# Patient Record
Sex: FEMALE | Race: WHITE | Employment: OTHER | RURAL
[De-identification: names, ages, dates, MRNs, and addresses within clinical notes are randomized per-mention and may not be internally consistent; named-entity substitution may affect disease eponyms.]

---

## 2020-09-14 ENCOUNTER — VIRTUAL VISIT (OUTPATIENT)
Dept: FAMILY MEDICINE CLINIC | Age: 65
End: 2020-09-14
Payer: MEDICARE

## 2020-09-14 DIAGNOSIS — M19.90 ARTHRITIS: ICD-10-CM

## 2020-09-14 DIAGNOSIS — G47.00 INSOMNIA, UNSPECIFIED TYPE: ICD-10-CM

## 2020-09-14 DIAGNOSIS — I10 ESSENTIAL HYPERTENSION: Primary | ICD-10-CM

## 2020-09-14 DIAGNOSIS — Z00.00 ENCOUNTER FOR MEDICAL EXAMINATION TO ESTABLISH CARE: ICD-10-CM

## 2020-09-14 DIAGNOSIS — F33.1 MODERATE EPISODE OF RECURRENT MAJOR DEPRESSIVE DISORDER (HCC): ICD-10-CM

## 2020-09-14 PROCEDURE — G8427 DOCREV CUR MEDS BY ELIG CLIN: HCPCS | Performed by: NURSE PRACTITIONER

## 2020-09-14 PROCEDURE — G8432 DEP SCR NOT DOC, RNG: HCPCS | Performed by: NURSE PRACTITIONER

## 2020-09-14 PROCEDURE — 99202 OFFICE O/P NEW SF 15 MIN: CPT | Performed by: NURSE PRACTITIONER

## 2020-09-14 PROCEDURE — G8400 PT W/DXA NO RESULTS DOC: HCPCS | Performed by: NURSE PRACTITIONER

## 2020-09-14 RX ORDER — CELECOXIB 200 MG/1
200 CAPSULE ORAL
Qty: 90 CAP | Refills: 0 | Status: SHIPPED | OUTPATIENT
Start: 2020-09-14 | End: 2020-12-17

## 2020-09-14 RX ORDER — DULOXETIN HYDROCHLORIDE 30 MG/1
30 CAPSULE, DELAYED RELEASE ORAL DAILY
COMMUNITY
End: 2020-09-14 | Stop reason: SDUPTHER

## 2020-09-14 RX ORDER — DULOXETIN HYDROCHLORIDE 30 MG/1
30 CAPSULE, DELAYED RELEASE ORAL DAILY
Qty: 90 CAP | Refills: 0 | Status: SHIPPED | OUTPATIENT
Start: 2020-09-14 | End: 2020-12-14

## 2020-09-14 RX ORDER — ATENOLOL 50 MG/1
TABLET ORAL DAILY
COMMUNITY
End: 2020-09-14 | Stop reason: SDUPTHER

## 2020-09-14 RX ORDER — ATENOLOL 50 MG/1
50 TABLET ORAL DAILY
Qty: 90 TAB | Refills: 0 | Status: SHIPPED | OUTPATIENT
Start: 2020-09-14 | End: 2020-12-14

## 2020-09-14 RX ORDER — CELECOXIB 200 MG/1
CAPSULE ORAL
COMMUNITY
End: 2020-09-14 | Stop reason: SDUPTHER

## 2020-09-14 RX ORDER — ESZOPICLONE 2 MG/1
TABLET, FILM COATED ORAL
COMMUNITY
End: 2020-09-14 | Stop reason: SDUPTHER

## 2020-09-14 RX ORDER — ESZOPICLONE 2 MG/1
2 TABLET, FILM COATED ORAL
Qty: 90 TAB | Refills: 0 | Status: SHIPPED | OUTPATIENT
Start: 2020-09-14 | End: 2020-12-14

## 2020-09-14 NOTE — PROGRESS NOTES
HISTORY OF PRESENT ILLNESS  Nica Jalloh is a 75 y.o. female. HPI   Pt presents as a new patient to [facility] care  Visit was conducted via [employer] me  Pt was located at home, provider was located at [facility]  Visit lasted 14 minutes  Nica Jalloh, who was evaluated through a synchronous (real-time) audio-video encounter, and/or her healthcare decision maker, is aware that it is a billable service, with coverage as determined by her insurance carrier. She provided verbal consent to proceed: Yes, and patient identification was verified. It was conducted pursuant to the emergency declaration under the [facility], [address] [place] [address] authority and the [employer] and [employer] General Act. A caregiver was present when appropriate. Ability to conduct physical exam was limited. I was in the office. The patient was at home. Pt is a new patient, just moved from [place] about a month ago. Pt was last seen by her PCP in [place] in May, and will get records sent here  Pt has family in the area    Pt was diagnosed with gastroparesis several years ago, and manages this with a modified diet. She has controlled BP on atenolol, and states that she has been on this medication for 20 years. She has chronic depression and arthritis. She does need refills at this time. Review of Systems   Constitutional: Negative for fever. Physical Exam  Constitutional:       Appearance: Normal appearance. Pulmonary:      Effort: Pulmonary effort is normal.   Neurological:      Mental Status: She is alert. Psychiatric:         Mood and Affect: Mood normal.         Behavior: Behavior normal.         ASSESSMENT and PLAN    ICD-10-CM ICD-9-CM    1. Essential hypertension  I10 401.9 atenoloL (TENORMIN) 50 mg tablet   2. Arthritis  M19.90 716.90 DULoxetine (CYMBALTA) 30 mg capsule      celecoxib (CELEBREX) 200 mg capsule   3.  Insomnia, unspecified type  G47.00 780.52 eszopiclone (LUNESTA) 2 mg tablet   4. Moderate episode of recurrent major depressive disorder (HCC)  F33.1 296.32 DULoxetine (CYMBALTA) 30 mg capsule   5. Encounter for medical examination to establish care  Z00.00 V70.9      Educated about returning to office in 3 months, for in person appointment  Will get labs at that time  Educated about getting records faxed here, and will review these when they return    Pt informed to return to office with worsening of symptoms, or PRN with any questions or concerns. Pt verbalizes understanding of plan of care and denies further questions or concerns at this time.

## 2020-11-17 ENCOUNTER — OFFICE VISIT (OUTPATIENT)
Dept: FAMILY MEDICINE CLINIC | Age: 65
End: 2020-11-17
Payer: MEDICARE

## 2020-11-17 VITALS
HEART RATE: 54 BPM | DIASTOLIC BLOOD PRESSURE: 63 MMHG | TEMPERATURE: 97 F | RESPIRATION RATE: 16 BRPM | SYSTOLIC BLOOD PRESSURE: 119 MMHG | WEIGHT: 164 LBS | OXYGEN SATURATION: 98 % | BODY MASS INDEX: 27.32 KG/M2 | HEIGHT: 65 IN

## 2020-11-17 DIAGNOSIS — Z23 ENCOUNTER FOR IMMUNIZATION: ICD-10-CM

## 2020-11-17 DIAGNOSIS — Z00.00 MEDICARE ANNUAL WELLNESS VISIT, SUBSEQUENT: Primary | ICD-10-CM

## 2020-11-17 DIAGNOSIS — Z12.31 ENCOUNTER FOR SCREENING MAMMOGRAM FOR MALIGNANT NEOPLASM OF BREAST: ICD-10-CM

## 2020-11-17 DIAGNOSIS — M25.562 CHRONIC PAIN OF LEFT KNEE: ICD-10-CM

## 2020-11-17 DIAGNOSIS — G89.29 CHRONIC PAIN OF LEFT KNEE: ICD-10-CM

## 2020-11-17 PROCEDURE — 90471 IMMUNIZATION ADMIN: CPT | Performed by: NURSE PRACTITIONER

## 2020-11-17 PROCEDURE — 90715 TDAP VACCINE 7 YRS/> IM: CPT | Performed by: NURSE PRACTITIONER

## 2020-11-17 PROCEDURE — 3017F COLORECTAL CA SCREEN DOC REV: CPT | Performed by: NURSE PRACTITIONER

## 2020-11-17 PROCEDURE — G9899 SCRN MAM PERF RSLTS DOC: HCPCS | Performed by: NURSE PRACTITIONER

## 2020-11-17 PROCEDURE — G8427 DOCREV CUR MEDS BY ELIG CLIN: HCPCS | Performed by: NURSE PRACTITIONER

## 2020-11-17 PROCEDURE — G9717 DOC PT DX DEP/BP F/U NT REQ: HCPCS | Performed by: NURSE PRACTITIONER

## 2020-11-17 PROCEDURE — G0439 PPPS, SUBSEQ VISIT: HCPCS | Performed by: NURSE PRACTITIONER

## 2020-11-17 PROCEDURE — 90715 TDAP VACCINE 7 YRS/> IM: CPT

## 2020-11-17 PROCEDURE — G8419 CALC BMI OUT NRM PARAM NOF/U: HCPCS | Performed by: NURSE PRACTITIONER

## 2020-11-17 PROCEDURE — 1101F PT FALLS ASSESS-DOCD LE1/YR: CPT | Performed by: NURSE PRACTITIONER

## 2020-11-17 PROCEDURE — G8536 NO DOC ELDER MAL SCRN: HCPCS | Performed by: NURSE PRACTITIONER

## 2020-11-17 PROCEDURE — G8400 PT W/DXA NO RESULTS DOC: HCPCS | Performed by: NURSE PRACTITIONER

## 2020-11-17 NOTE — PROGRESS NOTES
Date of visit: 11/17/2020       This is a Subsequent Medicare Annual Wellness Visit (AWV), (Performed more than 12 months after effective date of Medicare Part B enrollment and 12 months after last preventive visit, Once in a lifetime)    I have reviewed the patient's medical history in detail and updated the computerized patient record. Bronson Leiva is a 75 y.o. female   History obtained from: the patient. Concerns today   (Patient understands that medical problems addressed today may incur additional cost as this is a preventive visit)    History     Patient Active Problem List   Diagnosis Code    Gastroparesis K31.84    Arthritis M19.90    Insomnia G47.00    Depression F32.9    HTN (hypertension) I10     Past Medical History:   Diagnosis Date    Arthritis     Depression     Gastroparesis     Hip fracture ([address]     March, right    HTN (hypertension)     Insomnia       No past surgical history on file. Allergies   Allergen Reactions    Corn Starch Rash    Pcn [Penicillins] Rash     Current Outpatient Medications   Medication Sig Dispense Refill    eszopiclone (LUNESTA) 2 mg tablet Take 1 Tab by mouth nightly. Max Daily Amount: 2 mg. 90 Tab 0    DULoxetine (CYMBALTA) 30 mg capsule Take 1 Cap by mouth daily. 90 Cap 0    celecoxib (CELEBREX) 200 mg capsule Take 1 Cap by mouth daily as needed for Pain. 90 Cap 0    atenoloL (TENORMIN) 50 mg tablet Take 1 Tab by mouth daily. 90 Tab 0     No family history on file. Social History     Tobacco Use    Smoking status: Not on file   Substance Use Topics    Alcohol use: Not on file       Specialists/Care Team   Bronson Leiva has established care with the following healthcare providers:  Patient Care Team:  Sushant Salcedo NP as PCP - General (Nurse Practitioner)  Sushant Salcedo NP as PCP - REHABILITATION [facility] Provider      Health Risk Assessment     Demographics   female  75 y.o.     General Health Questions   -During the past 4 weeks:   -how would you rate your health in general? Good   -how often have you been bothered by feeling dizzy when standing up? never   -how much have you been bothered by bodily pain? moderately   -Have you noticed any hearing difficulties? no   -has your physical and emotional health limited your social activities with family or friends? no    Emotional Health Questions   -Do you have a history of depression, anxiety, or emotional problems? yes  -Over the past 2 weeks, have you felt down, depressed or hopeless? no  -Over the past 2 weeks, have you felt little interest or pleasure in doing things? no    Health Habits   Please describe your diet habits: gastroparesis, uses a lot of protein drinks  Do you get 5 servings of fruits or vegetables daily? no  Do you exercise regularly? yes    Activities of Daily Living and Functional Status   -Do you need help with eating, walking, dressing, bathing, toileting, the phone, transportation, shopping, preparing meals, housework, laundry, medications or managing money? no  -In the past four weeks, was someone available to help you if you needed and wanted help with anything? yes  -Are you confident are you that you can control and manage most of your health problems? yes  -Have you been given information to help you keep track of your medications? yes  -How often do you have trouble taking your medications as prescribed? never    Fall Risk and Home Safety   Have you fallen 2 or more times in the past year? Yes, Poornima Greenberg in march in [place] in the yard. Then was going down the steps, and got caught on the tread. Just some bruising noted. Does your home have rugs in the hallway, lack grab bars in the bathroom, lack handrails on the stairs or have poor lighting? no  Do you have smoke detectors and check them regularly?  yes  Do you have difficulties driving a car? no  Do you always fasten your seat belt when you are in a car? yes    Review of Systems (if indicated for problems addressed today) Physical Examination   There were no vitals filed for this visit. There is no height or weight on file to calculate BMI. No exam data present  Was the patient's timed Up & Go test unsteady or longer than 30 seconds? no    Evaluation of Cognitive Function   Mood/affect:  happy  Orientation: Person, Place, Time and Situation  Appearance: age appropriate  Family member/caregiver input: none    Additional exam if indicated for problems addressed today:  Pt has been dealing with medial left knee pain for years. She was seen by ortho in [place] for this, and was supposed to get an MRI of the knee, but moved before she was able to get this done. Pain comes and goes  Swelling is noted at times  She states that they did x-ray in [place] and it showed nothing, hence why they were planning on MRI    Advice/Referrals/Counseling (as indicated)   Education and counseling provided for any problems identified above: none    Preventive Services     (Preventive care checklist to be included in patient instructions)  Discussed today Done Previously Not Needed    x   Pneumococcal vaccines   x   Flu vaccine     x Hepatitis B vaccine (if at risk)   x   Shingles vaccine   x   TDAP vaccine   x   Mammogram   x   Pap smear   x   Colorectal cancer screening     x Low-dose CT for lung cancer screening   x   Bone density test    x  Glaucoma screening    x  Cholesterol test     x Diabetes screening test      x Diabetes self-management class     x Nutritionist referral for diabetes or renal disease     Discussion of Advance Directive   Discussed with Arabella Fuentes her ability to prepare and advance directive in the case that an injury or illness causes her to be unable to make health care decisions. Pt was given copy of Advance Directive, and will complete at home.     Assessment/Plan   Z00.00    Vaccine and VIS given    Educated about getting colonoscopy records sent here  Should get mammo ASAP  Pt declined all other  topics at this time    Should call ortho for an appointment today    Pt informed to return to office with worsening of symptoms, or PRN with any questions or concerns. Pt verbalizes understanding of plan of care and denies further questions or concerns at this time. No orders of the defined types were placed in this encounter.           Nieves De Los Santos NP

## 2020-11-17 NOTE — PATIENT INSTRUCTIONS
Vaccine Information Statement    Tdap (Tetanus, Diphtheria, Pertussis) Vaccine: What you need to know     Many Vaccine Information Statements are available in Georgian and other languages. See www.immunize.org/vis  Hojas de información sobre vacunas están disponibles en español y en muchos otros idiomas. Visite www.immunize.org/vis    1. Why get vaccinated? Tdap vaccine can prevent tetanus, diphtheria, and pertussis. Diphtheria and pertussis spread from person to person. Tetanus enters the body through cuts or wounds.  TETANUS (T) causes painful stiffening of the muscles. Tetanus can lead to serious health problems, including being unable to open the mouth, having trouble swallowing and breathing, or death.  DIPHTHERIA (D) can lead to difficulty breathing, heart failure, paralysis, or death.  PERTUSSIS (aP), also known as whooping cough, can cause uncontrollable, violent coughing which makes it hard to breathe, eat, or drink. Pertussis can be extremely serious in babies and young children, causing pneumonia, convulsions, brain damage, or death. In teens and adults, it can cause weight loss, loss of bladder control, passing out, and rib fractures from severe coughing. 2. Tdap vaccine     Tdap is only for children 7 years and older, adolescents, and adults. Adolescents should receive a single dose of Tdap, preferably at age 6 or 15 years. Pregnant women should get a dose of Tdap during every pregnancy, to protect the  from pertussis. Infants are most at risk for severe, life-threatening complications from pertussis. Adults who have never received Tdap should get a dose of Tdap. Also, adults should receive a booster dose every 10 years, or earlier in the case of a severe and dirty wound or burn. Booster doses can be either Tdap or Td (a different vaccine that protects against tetanus and diphtheria but not pertussis).      Tdap may be given at the same time as other vaccines. 3. Talk with your health care provider    Tell your vaccine provider if the person getting the vaccine:   Has had an allergic reaction after a previous dose of any vaccine that protects against tetanus, diphtheria, or pertussis, or has any severe, life-threatening allergies.  Has had a coma, decreased level of consciousness, or prolonged seizures within 7 days after a previous dose of any pertussis vaccine (DTP, DTaP, or Tdap).  Has seizures or another nervous system problem.  Has ever had Guillain-Barré Syndrome (also called GBS).  Has had severe pain or swelling after a previous dose of any vaccine that protects against tetanus or diphtheria. In some cases, your health care provider may decide to postpone Tdap vaccination to a future visit. People with minor illnesses, such as a cold, may be vaccinated. People who are moderately or severely ill should usually wait until they recover before getting Tdap vaccine. Your health care provider can give you more information. 4. Risks of a vaccine reaction     Pain, redness, or swelling where the shot was given, mild fever, headache, feeling tired, and nausea, vomiting, diarrhea, or stomachache sometimes happen after Tdap vaccine. People sometimes faint after medical procedures, including vaccination. Tell your provider if you feel dizzy or have vision changes or ringing in the ears. As with any medicine, there is a very remote chance of a vaccine causing a severe allergic reaction, other serious injury, or death. 5. What if there is a serious problem? An allergic reaction could occur after the vaccinated person leaves the clinic. If you see signs of a severe allergic reaction (hives, swelling of the face and throat, difficulty breathing, a fast heartbeat, dizziness, or weakness), call 9-1-1 and get the person to the nearest hospital.    For other signs that concern you, call your health care provider.     Adverse reactions should be reported to the Vaccine Adverse Event Reporting System (VAERS). Your health care provider will usually file this report, or you can do it yourself. Visit the VAERS website at www.vaers. hhs.gov or call 4-746.635.3550. VAERS is only for reporting reactions, and VAERS staff do not give medical advice. 6. The National Vaccine Injury Compensation Program    The [facility] Vaccine Injury Compensation Program (VICP) is a federal program that was created to compensate people who may have been injured by certain vaccines. Visit the VICP website at www.[facility].gov/vaccinecompensation or call 4-511.286.7673 to learn about the program and about filing a claim. There is a time limit to file a claim for compensation. 7. How can I learn more?  Ask your health care provider.  Call your local or state health department.  Contact the Centers for Disease Control and Prevention (CDC):  - Call 7-710.856.8085 (1-800-CDC-INFO) or  - Visit CDCs website at www.cdc.gov/vaccines    Vaccine Information Statement (Interim)  Tdap (Tetanus, Diphtheria, Pertussis) Vaccine  04/01/2020  [address] NIGEL Lechuga [id number]   Department of Health and Human Services  Centers for Disease Control and Prevention    Office Use Only

## 2020-11-17 NOTE — PROGRESS NOTES
Identified pt with two pt identifiers(name and ). Chief Complaint   Patient presents with    Knee Pain     left knee pain - has had this assessed in the past in [place] and was just approved for MRI to assess it prior to moving to [place] so it was never done - has had ortho physician in [place] fax information to our office for review.  Annual Wellness Visit        Health Maintenance Due   Topic    Hepatitis C Screening     DTaP/Tdap/Td series (1 - Tdap)    Lipid Screen     Shingrix Vaccine Age 49> (1 of 2)    Colorectal Cancer Screening Combo     Breast Cancer Screen Mammogram     GLAUCOMA SCREENING Q2Y     Bone Densitometry (Dexa) Screening     Pneumococcal 65+ years (1 of 1 - PPSV23)    Flu Vaccine (1)    Medicare Yearly Exam        Wt Readings from Last 3 Encounters:   20 164 lb (74.4 kg)     Temp Readings from Last 3 Encounters:   20 97 °F (36.1 °C) (Oral)     BP Readings from Last 3 Encounters:   20 119/63     Pulse Readings from Last 3 Encounters:   20 (!) 54         Learning Assessment:  :     Learning Assessment 2020   PRIMARY LEARNER Patient   HIGHEST LEVEL OF EDUCATION - PRIMARY LEARNER  SOME COLLEGE   BARRIERS PRIMARY LEARNER NONE   CO-LEARNER CAREGIVER No   PRIMARY LANGUAGE ENGLISH   LEARNER PREFERENCE PRIMARY LISTENING     DEMONSTRATION   ANSWERED BY patient   RELATIONSHIP SELF       Depression Screening:  :     3 most recent PHQ Screens 2020   Little interest or pleasure in doing things Not at all   Feeling down, depressed, irritable, or hopeless Not at all   Total Score PHQ 2 0       Fall Risk Assessment:  :     No flowsheet data found. Abuse Screening:  :     Abuse Screening Questionnaire 2020   Do you ever feel afraid of your partner? N   Are you in a relationship with someone who physically or mentally threatens you? N   Is it safe for you to go home?  Y         Coordination of Care Questionnaire:  :     1) Have you been to an emergency room, urgent care clinic since your last visit? no   Hospitalized since your last visit? no             2) Have you seen or consulted any other health care providers outside of [address] [place] [address] since your last visit? no  (Include any pap smears or colon screenings in this section.)    3) Do you have an Advance Directive on file? no  Are you interested in receiving information about Advance Directives? no    Patient is accompanied by self. Reviewed record in preparation for visit and have obtained necessary documentation. Medication reconciliation up to date and corrected with patient at this time.

## 2020-12-12 DIAGNOSIS — I10 ESSENTIAL HYPERTENSION: ICD-10-CM

## 2020-12-12 DIAGNOSIS — F33.1 MODERATE EPISODE OF RECURRENT MAJOR DEPRESSIVE DISORDER (HCC): ICD-10-CM

## 2020-12-12 DIAGNOSIS — G47.00 INSOMNIA, UNSPECIFIED TYPE: ICD-10-CM

## 2020-12-12 DIAGNOSIS — M19.90 ARTHRITIS: ICD-10-CM

## 2020-12-14 RX ORDER — DULOXETIN HYDROCHLORIDE 30 MG/1
CAPSULE, DELAYED RELEASE ORAL
Qty: 90 CAP | Refills: 0 | Status: SHIPPED | OUTPATIENT
Start: 2020-12-14 | End: 2021-03-23

## 2020-12-14 RX ORDER — ESZOPICLONE 2 MG/1
2 TABLET, FILM COATED ORAL
Qty: 90 TAB | Refills: 0 | Status: SHIPPED | OUTPATIENT
Start: 2020-12-14 | End: 2021-03-23

## 2020-12-14 RX ORDER — ATENOLOL 50 MG/1
TABLET ORAL
Qty: 90 TAB | Refills: 0 | Status: SHIPPED | OUTPATIENT
Start: 2020-12-14 | End: 2021-06-17

## 2020-12-17 DIAGNOSIS — M19.90 ARTHRITIS: ICD-10-CM

## 2020-12-17 RX ORDER — CELECOXIB 200 MG/1
200 CAPSULE ORAL
Qty: 90 CAP | Refills: 0 | Status: SHIPPED | OUTPATIENT
Start: 2020-12-17 | End: 2021-09-22 | Stop reason: SDUPTHER

## 2020-12-22 ENCOUNTER — TELEPHONE (OUTPATIENT)
Dept: FAMILY MEDICINE CLINIC | Age: 65
End: 2020-12-22

## 2020-12-22 DIAGNOSIS — G89.29 CHRONIC PAIN OF LEFT KNEE: Primary | ICD-10-CM

## 2020-12-22 DIAGNOSIS — M25.562 CHRONIC PAIN OF LEFT KNEE: Primary | ICD-10-CM

## 2020-12-22 NOTE — TELEPHONE ENCOUNTER
Please see ortho order in the system done on 11/17/20. Americo Garrett also spoke with the pt on 11/17/20. Pt was supposed to callback with date of appt so referral could be done.

## 2020-12-22 NOTE — TELEPHONE ENCOUNTER
Per further review of the chart the last order for ortho was \"closed\" by Elli Irizarry on 12/17/20. Please place new order in system for pt and send to Sabine Davis at  to process please. Pt indicated that her upcoming appt is on 01/20/21. Thanks.

## 2020-12-22 NOTE — TELEPHONE ENCOUNTER
Doctor referral needs to be put in system for physical therapy for  to get the [place] insurance referral done - see pt's not below? ?    ----- Message from Reza Rico sent at 12/22/2020  8:47 AM EST -----  Regarding: MAIK Lopez/Telephone  Caller's first and last name: N/A  Reason for call: Pt is trying to contact office  Callback required yes/no and why: yes  Best contact number(s): [phone]  Details to clarify the request: Pt has been trying to reach the office for 2 weeks and has left several messages and no one has gotten back to her. Pt needs her prescriptions filled at a different pharmacy. She needs them filled at [facility], and she is running out of medication. PT had ortho apt 2 weeks ago and [facility] never sent the referral paperwork. Pt had to cancel and reschedule. They have still not received the paperwork. Pt needs the paperwork before January mid-month since she had to reschedule her apt. Pt is upset because she never hears back from the office.

## 2021-02-25 ENCOUNTER — TELEPHONE (OUTPATIENT)
Dept: FAMILY MEDICINE CLINIC | Age: 66
End: 2021-02-25

## 2021-02-25 NOTE — TELEPHONE ENCOUNTER
I am happy to review the form and see if I can fill out without visit, but it is not in my box?   Thanks

## 2021-02-25 NOTE — TELEPHONE ENCOUNTER
Patient needs to have the faxed document filled out by [facility] so that she is able to donate plasma, they faxed the paper this morning to our office. Because of her age they what approval for her to be able to donate from her pcp. Please do asap. She would like to be able to donate tomorrow. You can call her at [phone]. She tried to donate today but was turned away.

## 2021-02-26 ENCOUNTER — VIRTUAL VISIT (OUTPATIENT)
Dept: FAMILY MEDICINE CLINIC | Age: 66
End: 2021-02-26

## 2021-02-26 DIAGNOSIS — L50.9 HIVES: Primary | ICD-10-CM

## 2021-02-26 PROCEDURE — 99441 PR PHYS/QHP TELEPHONE EVALUATION 5-10 MIN: CPT | Performed by: NURSE PRACTITIONER

## 2021-02-26 RX ORDER — TRIAMCINOLONE ACETONIDE 1 MG/G
CREAM TOPICAL 2 TIMES DAILY
Qty: 45 G | Refills: 0 | Status: SHIPPED | OUTPATIENT
Start: 2021-02-26

## 2021-02-26 NOTE — TELEPHONE ENCOUNTER
Pt called back to check on form and was informed that not received and will call company to get it re faxed over today

## 2021-02-26 NOTE — PROGRESS NOTES
HISTORY OF PRESENT ILLNESS  Aline Santos is a 77 y.o. female. HPI  Pt presents with \"hives\"  Visit was conducted via telephone  Pt was located at home, provider was located at [facility]  Visit lasted 5 minutes  Aline Santos, who was evaluated through a synchronous (real-time) audio only encounter, and/or her healthcare decision maker, is aware that it is a billable service, with coverage as determined by her insurance carrier. She provided verbal consent to proceed: Yes, and patient identification was verified. It was conducted pursuant to the emergency declaration under the [facility], [address] [place] [address] authority and the [employer] and [employer] General Act. A caregiver was present when appropriate. Ability to conduct physical exam was limited. I was in the office. The patient was at home. Pt states that about 4 years ago, she had a stressful event that caused her to break out in hives  She states that since then, when she gets stressed out, she breaks out in hives  She was given a cream, kenalog, and this has worked very well  She is about to be out of the bottle, and was hoping for a refill  Review of Systems   Constitutional: Negative for fever. Skin: Positive for itching and rash. Physical Exam  Neurological:      Mental Status: She is alert. Psychiatric:         Behavior: Behavior normal.         ASSESSMENT and PLAN    ICD-10-CM ICD-9-CM    1. Hives  L50.9 708.9 triamcinolone acetonide (KENALOG) 0.1 % topical cream     Cream refilled  Educated about using as prescribed    Pt informed to return to office with worsening of symptoms, or PRN with any questions or concerns. Pt verbalizes understanding of plan of care and denies further questions or concerns at this time.

## 2021-03-01 ENCOUNTER — PATIENT MESSAGE (OUTPATIENT)
Dept: FAMILY MEDICINE CLINIC | Age: 66
End: 2021-03-01

## 2021-03-01 ENCOUNTER — TELEPHONE (OUTPATIENT)
Dept: FAMILY MEDICINE CLINIC | Age: 66
End: 2021-03-01

## 2021-03-01 NOTE — TELEPHONE ENCOUNTER
Please call patient and let her know that we received the paperwork in regards to her wanting to donate plasma. She has to have kidney function labs before this can be completed, so once she comes in for physical on Friday and gets her labs performed, I will be able to complete the form  Thanks!

## 2021-03-01 NOTE — TELEPHONE ENCOUNTER
----- Message from Maria Del Carmen Rand sent at 3/1/2021  7:15 AM EST -----  Regarding: /Telephone  General Message/Vendor Calls    Caller's first and last name:Laly Ortega      Reason for call:reschedule labs early in the morning for rescheduled appt       Callback required yes/no and why:yes      Best contact number(s):[phone]      Details to clarify the request:Pt requesting to schedule early morning labs and is following up on if paperwork were fax to [employer] .      Maria Del Carmen Rand

## 2021-03-05 ENCOUNTER — LAB ONLY (OUTPATIENT)
Dept: FAMILY MEDICINE CLINIC | Age: 66
End: 2021-03-05

## 2021-03-05 ENCOUNTER — OFFICE VISIT (OUTPATIENT)
Dept: FAMILY MEDICINE CLINIC | Age: 66
End: 2021-03-05
Payer: MEDICARE

## 2021-03-05 VITALS
TEMPERATURE: 98 F | DIASTOLIC BLOOD PRESSURE: 62 MMHG | BODY MASS INDEX: 27.49 KG/M2 | HEIGHT: 65 IN | WEIGHT: 165 LBS | SYSTOLIC BLOOD PRESSURE: 118 MMHG | HEART RATE: 53 BPM

## 2021-03-05 DIAGNOSIS — Z13.220 SCREENING FOR CHOLESTEROL LEVEL: ICD-10-CM

## 2021-03-05 DIAGNOSIS — Z13.29 SCREENING FOR THYROID DISORDER: ICD-10-CM

## 2021-03-05 DIAGNOSIS — Z13.820 SCREENING FOR OSTEOPOROSIS: ICD-10-CM

## 2021-03-05 DIAGNOSIS — Z12.31 ENCOUNTER FOR SCREENING MAMMOGRAM FOR MALIGNANT NEOPLASM OF BREAST: ICD-10-CM

## 2021-03-05 DIAGNOSIS — Z11.59 ENCOUNTER FOR HEPATITIS C SCREENING TEST FOR LOW RISK PATIENT: ICD-10-CM

## 2021-03-05 DIAGNOSIS — I10 ESSENTIAL HYPERTENSION: Primary | ICD-10-CM

## 2021-03-05 DIAGNOSIS — I10 ESSENTIAL HYPERTENSION: ICD-10-CM

## 2021-03-05 PROCEDURE — 1101F PT FALLS ASSESS-DOCD LE1/YR: CPT | Performed by: NURSE PRACTITIONER

## 2021-03-05 PROCEDURE — 1090F PRES/ABSN URINE INCON ASSESS: CPT | Performed by: NURSE PRACTITIONER

## 2021-03-05 PROCEDURE — 99397 PER PM REEVAL EST PAT 65+ YR: CPT | Performed by: NURSE PRACTITIONER

## 2021-03-05 PROCEDURE — 3017F COLORECTAL CA SCREEN DOC REV: CPT | Performed by: NURSE PRACTITIONER

## 2021-03-05 PROCEDURE — G9717 DOC PT DX DEP/BP F/U NT REQ: HCPCS | Performed by: NURSE PRACTITIONER

## 2021-03-05 PROCEDURE — G8752 SYS BP LESS 140: HCPCS | Performed by: NURSE PRACTITIONER

## 2021-03-05 PROCEDURE — G8754 DIAS BP LESS 90: HCPCS | Performed by: NURSE PRACTITIONER

## 2021-03-05 PROCEDURE — G9899 SCRN MAM PERF RSLTS DOC: HCPCS | Performed by: NURSE PRACTITIONER

## 2021-03-05 PROCEDURE — G8419 CALC BMI OUT NRM PARAM NOF/U: HCPCS | Performed by: NURSE PRACTITIONER

## 2021-03-05 NOTE — PATIENT INSTRUCTIONS
Cholesterol and Triglycerides Tests: About These Tests  What are they? Cholesterol and triglycerides tests measure the amount of fats in your blood. These fats have both \"good\" (HDL) and \"bad\" (LDL) cholesterol. Why are these tests done? These tests are done to help find out your risk of a heart attack and stroke. Your doctor uses your cholesterol levels plus other things such as blood pressure, age, and sex to calculate your risk. These tests also help your doctor find out how well medicine is working for some health problems. How do you prepare for these tests? · Your doctor may tell you to fast before your tests. This means that you do not eat or drink anything except water for 9 to 12 hours before the tests. In most cases, you can take your medicines with water the morning of the test.  · Do not eat high-fat foods the night before the tests. · Do not drink alcohol or do intense exercise the night before the tests. · Tell your doctor ALL the medicines, vitamins, supplements, and herbal remedies you take. Some may increase the risk of problems during your test. Your doctor will tell you if you should stop taking any of them before the test and how soon to do it. How are these tests done? A health professional uses a needle to take a blood sample, usually from the arm. Where can you learn more? Go to http://www.gray.com/  Enter V788 in the search box to learn more about \"Cholesterol and Triglycerides Tests: About These Tests. \"  Current as of: December 16, 2019               Content Version: 12.6  © 0691-8173 [employer]. Care instructions adapted under license by [employer] (which disclaims liability or warranty for this information). If you have questions about a medical condition or this instruction, always ask your healthcare professional. [address] any warranty or liability for your use of this information.

## 2021-03-05 NOTE — PROGRESS NOTES
Subjective:   77 y.o. female for Well Woman Check. Her gyne and breast care is done elsewhere by her Ob-Gyne physician. Patient Active Problem List    Diagnosis Date Noted    Gastroparesis     Arthritis     Insomnia     Depression     HTN (hypertension)      Current Outpatient Medications   Medication Sig Dispense Refill    triamcinolone acetonide (KENALOG) 0.1 % topical cream Apply  to affected area two (2) times a day. use thin layer 45 g 0    celecoxib (CELEBREX) 200 mg capsule TAKE 1 CAP BY MOUTH DAILY AS NEEDED FOR PAIN. 90 Cap 0    eszopiclone (LUNESTA) 2 mg tablet TAKE 1 TAB BY MOUTH NIGHTLY. MAX DAILY AMOUNT: 2 MG. 90 Tab 0    atenoloL (TENORMIN) 50 mg tablet TAKE 1 TABLET BY MOUTH EVERY DAY 90 Tab 0    DULoxetine (CYMBALTA) 30 mg capsule TAKE 1 CAPSULE BY MOUTH EVERY DAY 90 Cap 0     Allergies   Allergen Reactions    Corn Starch Rash    Pcn [Penicillins] Rash     Past Medical History:   Diagnosis Date    Arthritis     Depression     Gastroparesis     Hip fracture ([address]     March, right    HTN (hypertension)     Insomnia      Past Surgical History:   Procedure Laterality Date    HX APPENDECTOMY      HX BILATERAL SALPINGO-OOPHORECTOMY      HX CARPAL TUNNEL RELEASE Right     HX DILATION AND CURETTAGE      HX OTHER SURGICAL      Surgery repairs on both knees/Lateral Release    HX SHOULDER ARTHROSCOPY       Family History   Problem Relation Age of Onset    Diabetes Mother     Heart Disease Mother     COPD Mother     Hypertension Father     Kidney Disease Father      Social History     Tobacco Use    Smoking status: Former Smoker     Packs/day: 0.50     Quit date:      Years since quittin.1    Smokeless tobacco: Never Used   Substance Use Topics    Alcohol use: Not Currently        no labs done prior     Specific concerns today: Pt is here for physical and fasting labs. She is going to start donating plasma, and needs form completed for this.     She will get records sent here for her colonoscopy, as she believes that she is up to date. Review of Systems  A comprehensive review of systems was negative. Objective:   Blood pressure 118/62, pulse (!) 53, temperature 98 °F (36.7 °C), temperature source Oral, height 5' 5\" (1.651 m), weight 165 lb (74.8 kg). Physical Examination:   General appearance - alert, well appearing, and in no distress  Mental status - alert, oriented to person, place, and time  Eyes - pupils equal and reactive, extraocular eye movements intact  Ears - bilateral TM's and external ear canals normal  Neck - supple, no significant adenopathy  Lymphatics - no palpable lymphadenopathy, no hepatosplenomegaly  Chest - clear to auscultation, no wheezes, rales or rhonchi, symmetric air entry  Heart - normal rate, regular rhythm, normal S1, S2, no murmurs, rubs, clicks or gallops  Abdomen - soft, nontender, nondistended, no masses or organomegaly  Neurological - alert, oriented, normal speech, no focal findings or movement disorder noted  Extremities - peripheral pulses normal, no pedal edema, no clubbing or cyanosis  Skin - normal coloration and turgor, no rashes, no suspicious skin lesions noted     Assessment/Plan:     continue present plan, routine labs ordered    ICD-10-CM ICD-9-CM    1. Essential hypertension  I10 401.9 CBC W/O DIFF      METABOLIC PANEL, COMPREHENSIVE   2. Screening for cholesterol level  Z13.220 V77.91 LIPID PANEL   3. Screening for thyroid disorder  Z13.29 V77.0 THYROID CASCADE PROFILE   4. Encounter for hepatitis C screening test for low risk patient  Z11.59 V73.89 HEPATITIS C AB   5. Encounter for screening mammogram for malignant neoplasm of breast  Z12.31 V76.12 [facility] MAMMO BI SCREENING INCL CAD   6.  Screening for osteoporosis  Z13.820 V82.81 DEXA BONE DENSITY STUDY AXIAL      [facility] MAMMO BI SCREENING INCL CAD     Will notify when labs return, and inform her of any change in plan of care at that time  Educated about calling for DEXA and mammogram appointments    Pt informed to return to office with worsening of symptoms, or PRN with any questions or concerns. Pt verbalizes understanding of plan of care and denies further questions or concerns at this time.

## 2021-03-05 NOTE — PROGRESS NOTES
Identified pt with two pt identifiers(name and ).    Chief Complaint   Patient presents with   • Hypertension   • Depression   • Sleep Problem   • Medication Refill   • Labs     NPO since midnight        Health Maintenance Due   Topic   • Hepatitis C Screening    • COVID-19 Vaccine (1 of 2)   • Lipid Screen    • Colorectal Cancer Screening Combo    • Breast Cancer Screen Mammogram    • GLAUCOMA SCREENING Q2Y    • Bone Densitometry (Dexa) Screening        Wt Readings from Last 3 Encounters:   21 165 lb (74.8 kg)   20 164 lb (74.4 kg)     Temp Readings from Last 3 Encounters:   21 98 °F (36.7 °C) (Oral)   20 97 °F (36.1 °C) (Oral)     BP Readings from Last 3 Encounters:   21 118/62   20 119/63     Pulse Readings from Last 3 Encounters:   21 (!) 53   20 (!) 54         Learning Assessment:  :     Learning Assessment 2020   PRIMARY LEARNER Patient   HIGHEST LEVEL OF EDUCATION - PRIMARY LEARNER  SOME COLLEGE   BARRIERS PRIMARY LEARNER NONE   CO-LEARNER CAREGIVER No   PRIMARY LANGUAGE ENGLISH   LEARNER PREFERENCE PRIMARY LISTENING     DEMONSTRATION   ANSWERED BY patient   RELATIONSHIP SELF       Depression Screening:  :     3 most recent PHQ Screens 2020   Little interest or pleasure in doing things Not at all   Feeling down, depressed, irritable, or hopeless Not at all   Total Score PHQ 2 0       Fall Risk Assessment:  :     No flowsheet data found.    Abuse Screening:  :     Abuse Screening Questionnaire 2020   Do you ever feel afraid of your partner? N   Are you in a relationship with someone who physically or mentally threatens you? N   Is it safe for you to go home? Y                 Coordination of Care Questionnaire:  :     1) Have you been to an emergency room, urgent care clinic since your last visit? no   Hospitalized since your last visit? no             2) Have you seen or consulted any other health care providers outside of [facility] since your last visit? no  (Include any pap smears or colon screenings in this section.)    3) Do you have an Advance Directive on file? no  Are you interested in receiving information about Advance Directives? no    Patient is accompanied by self. Reviewed record in preparation for visit and have obtained necessary documentation. Medication reconciliation up to date and corrected with patient at this time.

## 2021-03-06 LAB
ALBUMIN SERPL-MCNC: 4.1 G/DL (ref 3.5–5)
ALBUMIN/GLOB SERPL: 1.5 {RATIO} (ref 1.1–2.2)
ALP SERPL-CCNC: 78 U/L (ref 45–117)
ALT SERPL-CCNC: 27 U/L (ref 12–78)
ANION GAP SERPL CALC-SCNC: 6 MMOL/L (ref 5–15)
AST SERPL-CCNC: 18 U/L (ref 15–37)
BILIRUB SERPL-MCNC: 0.4 MG/DL (ref 0.2–1)
BUN SERPL-MCNC: 21 MG/DL (ref 6–20)
BUN/CREAT SERPL: 27 (ref 12–20)
CALCIUM SERPL-MCNC: 9 MG/DL (ref 8.5–10.1)
CHLORIDE SERPL-SCNC: 109 MMOL/L (ref 97–108)
CHOLEST SERPL-MCNC: 224 MG/DL
CO2 SERPL-SCNC: 26 MMOL/L (ref 21–32)
CREAT SERPL-MCNC: 0.77 MG/DL (ref 0.55–1.02)
ERYTHROCYTE [DISTWIDTH] IN BLOOD BY AUTOMATED COUNT: 12 % (ref 11.5–14.5)
GLOBULIN SER CALC-MCNC: 2.8 G/DL (ref 2–4)
GLUCOSE SERPL-MCNC: 115 MG/DL (ref 65–100)
HCT VFR BLD AUTO: 43.7 % (ref 35–47)
HCV AB SERPL QL IA: NONREACTIVE
HCV COMMENT,HCGAC: NORMAL
HDLC SERPL-MCNC: 75 MG/DL
HDLC SERPL: 3 {RATIO} (ref 0–5)
HGB BLD-MCNC: 14.4 G/DL (ref 11.5–16)
LDLC SERPL CALC-MCNC: 129.4 MG/DL (ref 0–100)
LIPID PROFILE,FLP: ABNORMAL
MCH RBC QN AUTO: 30.6 PG (ref 26–34)
MCHC RBC AUTO-ENTMCNC: 33 G/DL (ref 30–36.5)
MCV RBC AUTO: 93 FL (ref 80–99)
NRBC # BLD: 0 K/UL (ref 0–0.01)
NRBC BLD-RTO: 0 PER 100 WBC
PLATELET # BLD AUTO: 183 K/UL (ref 150–400)
PMV BLD AUTO: 11.3 FL (ref 8.9–12.9)
POTASSIUM SERPL-SCNC: 4.5 MMOL/L (ref 3.5–5.1)
PROT SERPL-MCNC: 6.9 G/DL (ref 6.4–8.2)
RBC # BLD AUTO: 4.7 M/UL (ref 3.8–5.2)
SODIUM SERPL-SCNC: 141 MMOL/L (ref 136–145)
TRIGL SERPL-MCNC: 98 MG/DL (ref ?–150)
VLDLC SERPL CALC-MCNC: 19.6 MG/DL
WBC # BLD AUTO: 5.3 K/UL (ref 3.6–11)

## 2021-03-07 LAB — TSH SERPL-ACNC: 1.69 UIU/ML (ref 0.45–4.5)

## 2021-03-08 DIAGNOSIS — R73.01 ELEVATED FASTING GLUCOSE: Primary | ICD-10-CM

## 2021-03-08 NOTE — PROGRESS NOTES
Please call patient and let her know that her labs returned:  1. Cholesterol is elevated. Would advise low dose statin, to prevent heart attack and stroke. Is she willing to start? 2.  Glucose is mildly elevated, should return for lab only check of HgbA1C. I have completed forms for plasma center and faxed them in.   Thanks

## 2021-03-08 NOTE — PROGRESS NOTES
Patient was advised and verbalized understanding. She will come in tomorrow, 03/09/21 to check HgbA1c and would like to try diet control for the next 6 months noting if it is not effective she will start statin medication.

## 2021-03-08 NOTE — TELEPHONE ENCOUNTER
Forms have been completed and faxed back to [facility] at [phone]. Fax confirmation page was received showing the fax was sent successfully. Pt was advised via [employer] message.

## 2021-03-09 ENCOUNTER — TELEPHONE (OUTPATIENT)
Dept: FAMILY MEDICINE CLINIC | Age: 66
End: 2021-03-09

## 2021-03-09 ENCOUNTER — LAB ONLY (OUTPATIENT)
Dept: FAMILY MEDICINE CLINIC | Age: 66
End: 2021-03-09

## 2021-03-09 DIAGNOSIS — R73.01 ELEVATED FASTING GLUCOSE: ICD-10-CM

## 2021-03-09 NOTE — TELEPHONE ENCOUNTER
Scanning was picked up from our nurse station this morning to be sent to be scanned. I have asked Justin Seth that takes care of sending documents needing to be scanned out, to please make copy of her paperwork before it leaves the office for the pt. She advised me she would do this when she has a minute.

## 2021-03-09 NOTE — TELEPHONE ENCOUNTER
Patient wants a hard copy of the paper that were sent to [employer] so that she could donate plasma

## 2021-03-10 LAB
EST. AVERAGE GLUCOSE BLD GHB EST-MCNC: 120 MG/DL
HBA1C MFR BLD: 5.8 % (ref 4–5.6)

## 2021-03-10 NOTE — PROGRESS NOTES
Please call patient and let her know that her HgbA1C returned and is in prediabetic range. Needs to decrease carbs and sugar. Will continue to monitor, to ensure that this does not increase.   Thanks

## 2021-03-12 NOTE — TELEPHONE ENCOUNTER
----- Message from Karena Espinoza sent at 3/12/2021  8:38 AM EST -----  Regarding: MAIK Lopez/Telephone  Patient return call    Caller's first and last name and relationship (if not the patient):      Best contact number(s): [phone]      Whose call is being returned: Johnny Jacobson      Details to clarify the request: In regards to lab results       Karena Espinoza

## 2021-03-12 NOTE — TELEPHONE ENCOUNTER
Patient was advised of her recent lab results and verbalized understanding and has no further questions at this time.

## 2021-03-17 ENCOUNTER — TELEPHONE (OUTPATIENT)
Dept: FAMILY MEDICINE CLINIC | Age: 66
End: 2021-03-17

## 2021-03-17 NOTE — TELEPHONE ENCOUNTER
----- Message from Danay Bejarano sent at 3/17/2021  8:34 AM EDT -----  Regarding: MAIK Lopez/Telephone  General Message/Vendor Calls    Caller's first and last name: Daquan Vo      Reason for call: Resend Form to [facility] required yes/no and why: Yes      Best contact number(s): [phone]      Details to clarify the request: Pt stated that Gustavo Calle still has not received the forms that the pt requested PMA to send. PMA stated the forms were sent Monday, 3/8/21 but [facility] still has not received them.       Danay Bejarano

## 2021-03-17 NOTE — TELEPHONE ENCOUNTER
Beto Mcleod  3/9/21 3:01 PM  Note  Copy was made and sitting up front for pt to pickup 3/9/21 3:00 pm\"

## 2021-04-05 DIAGNOSIS — Z12.2 ENCOUNTER FOR SCREENING FOR LUNG CANCER: Primary | ICD-10-CM

## 2021-04-21 ENCOUNTER — HOSPITAL ENCOUNTER (OUTPATIENT)
Dept: CT IMAGING | Age: 66
Discharge: HOME | End: 2021-04-21
Attending: NURSE PRACTITIONER
Payer: MEDICARE

## 2021-04-21 DIAGNOSIS — Z12.2 ENCOUNTER FOR SCREENING FOR LUNG CANCER: ICD-10-CM

## 2021-04-21 PROCEDURE — 71271 CT THORAX LUNG CANCER SCR C-: CPT

## 2021-04-29 NOTE — PROGRESS NOTES
Patient had a CT-scan and needs further evaluation. I or one of the providers covering for NP, John, need to discuss the findings. ASAP. Please schedule follow up visit.    Thanks,   Dr. Richard Zayas

## 2021-05-06 ENCOUNTER — OFFICE VISIT (OUTPATIENT)
Dept: FAMILY MEDICINE CLINIC | Age: 66
End: 2021-05-06
Payer: MEDICARE

## 2021-05-06 VITALS
HEIGHT: 65 IN | DIASTOLIC BLOOD PRESSURE: 62 MMHG | HEART RATE: 55 BPM | BODY MASS INDEX: 27.49 KG/M2 | SYSTOLIC BLOOD PRESSURE: 126 MMHG | RESPIRATION RATE: 20 BRPM | WEIGHT: 165 LBS | OXYGEN SATURATION: 98 % | TEMPERATURE: 97.9 F

## 2021-05-06 DIAGNOSIS — F33.1 MODERATE EPISODE OF RECURRENT MAJOR DEPRESSIVE DISORDER (HCC): ICD-10-CM

## 2021-05-06 DIAGNOSIS — Z12.11 SCREENING FOR COLON CANCER: ICD-10-CM

## 2021-05-06 DIAGNOSIS — N63.10 BREAST MASS, RIGHT: Primary | ICD-10-CM

## 2021-05-06 PROCEDURE — G8419 CALC BMI OUT NRM PARAM NOF/U: HCPCS | Performed by: INTERNAL MEDICINE

## 2021-05-06 PROCEDURE — G8400 PT W/DXA NO RESULTS DOC: HCPCS | Performed by: INTERNAL MEDICINE

## 2021-05-06 PROCEDURE — G8536 NO DOC ELDER MAL SCRN: HCPCS | Performed by: INTERNAL MEDICINE

## 2021-05-06 PROCEDURE — G8427 DOCREV CUR MEDS BY ELIG CLIN: HCPCS | Performed by: INTERNAL MEDICINE

## 2021-05-06 PROCEDURE — G9899 SCRN MAM PERF RSLTS DOC: HCPCS | Performed by: INTERNAL MEDICINE

## 2021-05-06 PROCEDURE — 1101F PT FALLS ASSESS-DOCD LE1/YR: CPT | Performed by: INTERNAL MEDICINE

## 2021-05-06 PROCEDURE — 1090F PRES/ABSN URINE INCON ASSESS: CPT | Performed by: INTERNAL MEDICINE

## 2021-05-06 PROCEDURE — G8754 DIAS BP LESS 90: HCPCS | Performed by: INTERNAL MEDICINE

## 2021-05-06 PROCEDURE — 99213 OFFICE O/P EST LOW 20 MIN: CPT | Performed by: INTERNAL MEDICINE

## 2021-05-06 PROCEDURE — G9717 DOC PT DX DEP/BP F/U NT REQ: HCPCS | Performed by: INTERNAL MEDICINE

## 2021-05-06 PROCEDURE — 3017F COLORECTAL CA SCREEN DOC REV: CPT | Performed by: INTERNAL MEDICINE

## 2021-05-06 PROCEDURE — G8752 SYS BP LESS 140: HCPCS | Performed by: INTERNAL MEDICINE

## 2021-05-06 NOTE — PROGRESS NOTES
Identified pt with two pt identifiers(name and ). Chief Complaint   Patient presents with    Results     ct results        Health Maintenance Due   Topic    COVID-19 Vaccine (1)    Colorectal Cancer Screening Combo     Breast Cancer Screen Mammogram     Bone Densitometry (Dexa) Screening        Wt Readings from Last 3 Encounters:   21 165 lb (74.8 kg)   21 165 lb (74.8 kg)   20 164 lb (74.4 kg)     Temp Readings from Last 3 Encounters:   21 97.9 °F (36.6 °C) (Temporal)   21 98 °F (36.7 °C) (Oral)   20 97 °F (36.1 °C) (Oral)     BP Readings from Last 3 Encounters:   21 126/62   21 118/62   20 119/63     Pulse Readings from Last 3 Encounters:   21 (!) 55   21 (!) 53   20 (!) 54         Learning Assessment:  :     Learning Assessment 2020   PRIMARY LEARNER Patient   HIGHEST LEVEL OF EDUCATION - PRIMARY LEARNER  SOME COLLEGE   BARRIERS PRIMARY LEARNER NONE   CO-LEARNER CAREGIVER No   PRIMARY LANGUAGE ENGLISH   LEARNER PREFERENCE PRIMARY LISTENING     DEMONSTRATION   ANSWERED BY patient   RELATIONSHIP SELF       Depression Screening:  :     3 most recent PHQ Screens 2021   Little interest or pleasure in doing things Not at all   Feeling down, depressed, irritable, or hopeless Not at all   Total Score PHQ 2 0       Fall Risk Assessment:  :     No flowsheet data found. Abuse Screening:  :     Abuse Screening Questionnaire 2020   Do you ever feel afraid of your partner? N N   Are you in a relationship with someone who physically or mentally threatens you? N N   Is it safe for you to go home?  Y Y       Coordination of Care Questionnaire:  :     1) Have you been to an emergency room, urgent care clinic since your last visit? no   Hospitalized since your last visit? no             2) Have you seen or consulted any other health care providers outside of [address] [place] [address] since your last visit? no  (Include any pap smears or colon screenings in this section.)    3) Do you have an Advance Directive on file? no  Are you interested in receiving information about Advance Directives? no    Reviewed record in preparation for visit and have obtained necessary documentation.

## 2021-05-06 NOTE — PATIENT INSTRUCTIONS
Breast Lumps: Care Instructions  Your Care Instructions  Breast lumps are common, especially in women between ages 27 and 48. Many women's breasts feel lumpy and tender before their menstrual period. Women also may have lumps when they are breastfeeding. Breast lumps may go away after menopause. All new breast lumps in women after menopause should be checked by a doctor. Although lumps may be normal for you, it is important to have your doctor check any lump or thickness that is not like the rest of your breast to make sure it is not cancer. A lump may be larger, harder, or different from the rest of your breast tissue. Follow-up care is a key part of your treatment and safety. Be sure to make and go to all appointments, and call your doctor if you are having problems. It's also a good idea to know your test results and keep a list of the medicines you take. How can you care for yourself at home? · Make an appointment to have a mammogram and other follow-up visits as recommended by your doctor. When should you call for help? Watch closely for changes in your health, and be sure to contact your doctor if:    · You do not get better as expected.     · Your breast has changed.     · You have pain in your breast.     · You have a discharge from your nipple.     · A breast lump changes or does not go away. Where can you learn more? Go to http://www.gray.com/  Enter Q928 in the search box to learn more about \"Breast Lumps: Care Instructions. \"  Current as of: July 17, 2020               Content Version: 12.8  © 0003-2056 Healthwise, [employer]. Care instructions adapted under license by [employer] (which disclaims liability or warranty for this information). If you have questions about a medical condition or this instruction, always ask your healthcare professional. [address] any warranty or liability for your use of this information.

## 2021-05-07 ENCOUNTER — TELEPHONE (OUTPATIENT)
Dept: FAMILY MEDICINE CLINIC | Age: 66
End: 2021-05-07

## 2021-05-07 DIAGNOSIS — N64.59 OTHER SIGNS AND SYMPTOMS IN BREAST: ICD-10-CM

## 2021-05-07 DIAGNOSIS — N63.10 BREAST MASS, RIGHT: Primary | ICD-10-CM

## 2021-05-07 DIAGNOSIS — N64.89 OTHER SPECIFIED DISORDERS OF BREAST: ICD-10-CM

## 2021-05-10 DIAGNOSIS — M54.41 ACUTE BILATERAL LOW BACK PAIN WITH RIGHT-SIDED SCIATICA: Primary | ICD-10-CM

## 2021-05-10 RX ORDER — TIZANIDINE 2 MG/1
TABLET ORAL
Qty: 30 TAB | Refills: 0 | Status: SHIPPED | OUTPATIENT
Start: 2021-05-10 | End: 2022-09-16

## 2021-05-11 ENCOUNTER — HOSPITAL ENCOUNTER (OUTPATIENT)
Dept: MAMMOGRAPHY | Age: 66
Discharge: HOME | End: 2021-05-11
Attending: INTERNAL MEDICINE
Payer: MEDICARE

## 2021-05-11 ENCOUNTER — HOSPITAL ENCOUNTER (OUTPATIENT)
Dept: GENERAL RADIOLOGY | Age: 66
Discharge: HOME | End: 2021-05-11
Attending: INTERNAL MEDICINE
Payer: MEDICARE

## 2021-05-11 DIAGNOSIS — N63.10 BREAST MASS, RIGHT: ICD-10-CM

## 2021-05-11 DIAGNOSIS — N64.89 OTHER SPECIFIED DISORDERS OF BREAST: ICD-10-CM

## 2021-05-11 DIAGNOSIS — M54.41 ACUTE BILATERAL LOW BACK PAIN WITH RIGHT-SIDED SCIATICA: ICD-10-CM

## 2021-05-11 PROBLEM — F33.1 MODERATE EPISODE OF RECURRENT MAJOR DEPRESSIVE DISORDER (HCC): Status: ACTIVE | Noted: 2021-05-11

## 2021-05-11 PROCEDURE — 77062 BREAST TOMOSYNTHESIS BI: CPT

## 2021-05-11 PROCEDURE — 72100 X-RAY EXAM L-S SPINE 2/3 VWS: CPT

## 2021-05-11 NOTE — PROGRESS NOTES
Chief Complaint   Patient presents with    Results     ct results     Assessment/ Plan:   Diagnoses and all orders for this visit:    1. Breast mass, right  Comments: With biopsy if possible. Nodule seen behind the R-nipple on CT-chest.   Orders:  -     US BREAST RT COMPLETE 4 QUAD; Future  -     DYLAN MAMMO RT DX INCL CAD; Future    2. Screening for colon cancer  -     REFERRAL TO GASTROENTEROLOGY    3. Moderate episode of recurrent major depressive disorder [facility])  Assessment & Plan:   unclear control, continue current medications, continue current treatment plan     I have discussed the diagnosis with the patient and the intended treatment plan as seen in the above orders. The patient has received an after-visit summary and questions were answered concerning future plans. Asked to return should symptoms worsen or not improve with treatment. Any pending labs and studies will be relayed to patient when they become available. Pt verbalizes understanding of plan of care and denies further questions or concerns at this time. Follow-up and Dispositions    · Return if symptoms worsen or fail to improve. Subjective:   Phoenix Mata is a 77 y.o. female who presents today for follow up of recent CT-scan which was done to screen for lung cancer. She is usually seen by NP, John. During the evaluation a nodule was noted behind the R-breast nipple and further work up was suggested. Findings were:    IMPRESSION  1. 4 mm nodule in the right lower lobe. This can be evaluated on follow-up. 2. Small nodule in the right breast deep to the nipple. Further evaluation with  mammography is suggested. .     Lung-RADS Category: 2 S: Nodule with very low likelihood of becoming clinically  significant. Management recommendation: Continue annual screening with low dose CT scan in 12  months.  Please see above text concerning possible nodule in right breast.    I reviewed the results with the patient and will set her up for mammographic evaluation. In addition, reviewed the lung findings as well. Patient's HM was reviewed as well. She needs to be sent for a colonoscopy and this was ordered today and other HM and care gaps reviewed. Patient Active Problem List    Diagnosis Date Noted    Moderate episode of recurrent major depressive disorder ([facility] [address] 05/11/2021    Gastroparesis     Arthritis     Insomnia     Depression     HTN (hypertension)          Current Outpatient Medications   Medication Sig Dispense Refill    DULoxetine (CYMBALTA) 30 mg capsule TAKE ONE CAPSULE BY MOUTH DAILY 90 Cap 0    eszopiclone (LUNESTA) 2 mg tablet TAKE ONE TABLET BY MOUTH EVERY NIGHT AT BEDTIME 90 Tab 0    triamcinolone acetonide (KENALOG) 0.1 % topical cream Apply  to affected area two (2) times a day. use thin layer 45 g 0    celecoxib (CELEBREX) 200 mg capsule TAKE 1 CAP BY MOUTH DAILY AS NEEDED FOR PAIN. 90 Cap 0    atenoloL (TENORMIN) 50 mg tablet TAKE 1 TABLET BY MOUTH EVERY DAY 90 Tab 0    tiZANidine (ZANAFLEX) 2 mg tablet 1-2 tablets, 1-2 hours prior to bedtime.  30 Tab 0         Allergies   Allergen Reactions    Corn Starch Rash    Pcn [Penicillins] Rash         Past Medical History:   Diagnosis Date    Arthritis     Depression     Gastroparesis     Hip fracture ([facility] [address]     March, right    HTN (hypertension)     Insomnia          Past Surgical History:   Procedure Laterality Date    HX APPENDECTOMY      HX BILATERAL SALPINGO-OOPHORECTOMY      HX CARPAL TUNNEL RELEASE Right 2003    HX DILATION AND CURETTAGE      HX OTHER SURGICAL      Surgery repairs on both knees/Lateral Release    HX SHOULDER ARTHROSCOPY           Family History   Problem Relation Age of Onset    Diabetes Mother     Heart Disease Mother     COPD Mother     Hypertension Father     Kidney Disease Father     Heart Attack Brother     Breast Cancer Cousin         Also had ovarian cancer     Breast Cancer Cousin     Breast Cancer Cousin Social History     Tobacco Use    Smoking status: Former Smoker     Packs/day: 0.50     Quit date:      Years since quittin.3    Smokeless tobacco: Never Used   Substance Use Topics    Alcohol use: Not Currently          Review of Systems  A 14-point comprehensive review of systems was negative except for that written in the HPI. Objective:     Vitals:    21 1415   BP: 126/62   Pulse: (!) 55   Resp: 20   Temp: 97.9 °F (36.6 °C)   TempSrc: Temporal   SpO2: 98%   Weight: 165 lb (74.8 kg)   Height: 5' 5\" (1.651 m)       General: alert, cooperative, no distress   Mental  status: normal mood, behavior, speech, dress, motor activity, and thought processes, able to follow commands   HENT: NCAT   Neck: no visualized mass   Resp: no respiratory distress   Neuro: no gross deficits   Skin: no discoloration or lesions of concern on visible areas   Psychiatric: normal affect, consistent with stated mood, no evidence of hallucinations     Angelica Abraham MD  Patient Instructions          Breast Lumps: Care Instructions  Your Care Instructions  Breast lumps are common, especially in women between ages 27 and 48. Many women's breasts feel lumpy and tender before their menstrual period. Women also may have lumps when they are breastfeeding. Breast lumps may go away after menopause. All new breast lumps in women after menopause should be checked by a doctor. Although lumps may be normal for you, it is important to have your doctor check any lump or thickness that is not like the rest of your breast to make sure it is not cancer. A lump may be larger, harder, or different from the rest of your breast tissue. Follow-up care is a key part of your treatment and safety. Be sure to make and go to all appointments, and call your doctor if you are having problems. It's also a good idea to know your test results and keep a list of the medicines you take. How can you care for yourself at home?   · Make an appointment to have a mammogram and other follow-up visits as recommended by your doctor. When should you call for help? Watch closely for changes in your health, and be sure to contact your doctor if:    · You do not get better as expected.     · Your breast has changed.     · You have pain in your breast.     · You have a discharge from your nipple.     · A breast lump changes or does not go away. Where can you learn more? Go to http://www.gray.com/  Enter Q928 in the search box to learn more about \"Breast Lumps: Care Instructions. \"  Current as of: July 17, 2020               Content Version: 12.8  © 0927-9246 Healthwise, [employer]. Care instructions adapted under license by [employer] (which disclaims liability or warranty for this information). If you have questions about a medical condition or this instruction, always ask your healthcare professional. [address] any warranty or liability for your use of this information.

## 2021-05-12 ENCOUNTER — TRANSCRIBE ORDER (OUTPATIENT)
Dept: SCHEDULING | Age: 66
End: 2021-05-12

## 2021-05-12 DIAGNOSIS — N63.10 MASS OF RIGHT BREAST: Primary | ICD-10-CM

## 2021-05-12 DIAGNOSIS — N64.89 GALACTOCELE: ICD-10-CM

## 2021-05-12 DIAGNOSIS — N64.59 INVERTED NIPPLE: ICD-10-CM

## 2021-05-16 NOTE — PROGRESS NOTES
Advise pt CT lung shows tiny R lower lobe nodule. Nothing worrisome at this time. Plan to repeat CT chest 1 year because of smoking hx.

## 2021-05-20 DIAGNOSIS — M25.552 LEFT HIP PAIN: Primary | ICD-10-CM

## 2021-05-21 ENCOUNTER — PATIENT MESSAGE (OUTPATIENT)
Dept: FAMILY MEDICINE CLINIC | Age: 66
End: 2021-05-21

## 2021-05-25 ENCOUNTER — HOSPITAL ENCOUNTER (OUTPATIENT)
Dept: GENERAL RADIOLOGY | Age: 66
Discharge: HOME | End: 2021-05-25
Attending: INTERNAL MEDICINE
Payer: MEDICARE

## 2021-05-25 DIAGNOSIS — M25.552 LEFT HIP PAIN: ICD-10-CM

## 2021-05-25 PROCEDURE — 73502 X-RAY EXAM HIP UNI 2-3 VIEWS: CPT

## 2021-05-26 DIAGNOSIS — M19.90 ARTHRITIS: Primary | ICD-10-CM

## 2021-05-26 DIAGNOSIS — M25.552 LEFT HIP PAIN: ICD-10-CM

## 2021-06-03 ENCOUNTER — HOSPITAL ENCOUNTER (OUTPATIENT)
Dept: MAMMOGRAPHY | Age: 66
Discharge: HOME | End: 2021-06-03
Attending: NURSE PRACTITIONER
Payer: MEDICARE

## 2021-06-03 ENCOUNTER — TELEPHONE (OUTPATIENT)
Dept: FAMILY MEDICINE CLINIC | Age: 66
End: 2021-06-03

## 2021-06-03 DIAGNOSIS — Z13.820 SCREENING FOR OSTEOPOROSIS: ICD-10-CM

## 2021-06-03 DIAGNOSIS — Z12.31 ENCOUNTER FOR SCREENING MAMMOGRAM FOR MALIGNANT NEOPLASM OF BREAST: ICD-10-CM

## 2021-06-03 PROCEDURE — 77080 DXA BONE DENSITY AXIAL: CPT

## 2021-06-03 NOTE — TELEPHONE ENCOUNTER
Spoke to mammography center. They canceled repeat mammogram for today as the results of mammo on 5/11/21 have been addended to show no cancer and to repeat in a yr. They did the bone density test. I verbaly to this information to Dr Christiano Andino.

## 2021-06-04 NOTE — PROGRESS NOTES
Please ask Ms. Ortega to set up a virtual visit to review her recent bone density. She has osteoporosis and we need to discuss this.    Thanks,   Dr. Dina Davila

## 2021-06-08 ENCOUNTER — VIRTUAL VISIT (OUTPATIENT)
Dept: FAMILY MEDICINE CLINIC | Age: 66
End: 2021-06-08
Payer: MEDICARE

## 2021-06-08 DIAGNOSIS — M81.0 AGE-RELATED OSTEOPOROSIS WITHOUT CURRENT PATHOLOGICAL FRACTURE: Primary | ICD-10-CM

## 2021-06-08 PROCEDURE — 1101F PT FALLS ASSESS-DOCD LE1/YR: CPT | Performed by: INTERNAL MEDICINE

## 2021-06-08 PROCEDURE — G8536 NO DOC ELDER MAL SCRN: HCPCS | Performed by: INTERNAL MEDICINE

## 2021-06-08 PROCEDURE — 99213 OFFICE O/P EST LOW 20 MIN: CPT | Performed by: INTERNAL MEDICINE

## 2021-06-08 PROCEDURE — G8756 NO BP MEASURE DOC: HCPCS | Performed by: INTERNAL MEDICINE

## 2021-06-08 PROCEDURE — 1090F PRES/ABSN URINE INCON ASSESS: CPT | Performed by: INTERNAL MEDICINE

## 2021-06-08 PROCEDURE — G9899 SCRN MAM PERF RSLTS DOC: HCPCS | Performed by: INTERNAL MEDICINE

## 2021-06-08 PROCEDURE — 3017F COLORECTAL CA SCREEN DOC REV: CPT | Performed by: INTERNAL MEDICINE

## 2021-06-08 PROCEDURE — G8419 CALC BMI OUT NRM PARAM NOF/U: HCPCS | Performed by: INTERNAL MEDICINE

## 2021-06-08 PROCEDURE — G9717 DOC PT DX DEP/BP F/U NT REQ: HCPCS | Performed by: INTERNAL MEDICINE

## 2021-06-08 PROCEDURE — G8427 DOCREV CUR MEDS BY ELIG CLIN: HCPCS | Performed by: INTERNAL MEDICINE

## 2021-06-08 NOTE — PROGRESS NOTES
Chief Complaint   Patient presents with    Follow-up         Juliet Ledbetter is a 77 y.o. female who was seen by synchronous (real-time) audio-video technology on 6/8/2021 for Follow-up        Assessment & Plan:   Diagnoses and all orders for this visit:    1. Age-related osteoporosis without current pathological fracture  -     REFERRAL TO ENDOCRINOLOGY   This patient is osteoporotic using the World Health Organization criteria given   the history of fragility fracture. 10 year probability of major osteoporotic fracture: 31%   10 year probability of hip fracture: 4.6%    Follow-up and Dispositions    · Return if symptoms worsen or fail to improve. I spent at least 20 minutes on this visit with this established patient. Subjective:   65F who presents for follow up after recent bone density testing showed she has osteoporosis. She reports that she has never been on a bisphosphonate and this was the first bone density she had since coming to our practice. She reports that she has not always been able to eat enough vitamin D or calcium rich foods. Additionally, she reports that in the past, she had bone chards in her jaw and dentist was surprised by this. We discussed sending her to be evaluated by endocrinology to recommend treat me halt progression of her osteoporosis. Patient Active Problem List    Diagnosis Date Noted    Moderate episode of recurrent major depressive disorder ([facility] [address] 05/11/2021    Gastroparesis     Arthritis     Insomnia     Depression     HTN (hypertension)      Current Outpatient Medications   Medication Sig Dispense Refill    tiZANidine (ZANAFLEX) 2 mg tablet 1-2 tablets, 1-2 hours prior to bedtime.  30 Tab 0    DULoxetine (CYMBALTA) 30 mg capsule TAKE ONE CAPSULE BY MOUTH DAILY 90 Cap 0    eszopiclone (LUNESTA) 2 mg tablet TAKE ONE TABLET BY MOUTH EVERY NIGHT AT BEDTIME 90 Tab 0    triamcinolone acetonide (KENALOG) 0.1 % topical cream Apply  to affected area two (2) times a day. use thin layer 45 g 0    celecoxib (CELEBREX) 200 mg capsule TAKE 1 CAP BY MOUTH DAILY AS NEEDED FOR PAIN. 90 Cap 0    atenoloL (TENORMIN) 50 mg tablet TAKE 1 TABLET BY MOUTH EVERY DAY 90 Tab 0     Allergies   Allergen Reactions    Corn Starch Rash    Pcn [Penicillins] Rash     Past Medical History:   Diagnosis Date    Arthritis     Depression     Gastroparesis     Hip fracture ([address]     March, right    HTN (hypertension)     Insomnia     Menopause     LMP-30years old     Past Surgical History:   Procedure Laterality Date    HX APPENDECTOMY      HX BILATERAL SALPINGO-OOPHORECTOMY      HX CARPAL TUNNEL RELEASE Right     HX DILATION AND CURETTAGE      HX HYSTERECTOMY      LMP-30years old    HX OTHER SURGICAL      Surgery repairs on both knees/Lateral Release    HX SHOULDER ARTHROSCOPY       Family History   Problem Relation Age of Onset    Diabetes Mother     Heart Disease Mother     COPD Mother     Hypertension Father     Kidney Disease Father     Heart Attack Brother     Breast Cancer Cousin         Also had ovarian cancer--age unknown--maternal     Breast Cancer Cousin         age unknown--maternal    Breast Cancer Cousin         age unknown--maternal     Social History     Tobacco Use    Smoking status: Former Smoker     Packs/day: 0.50     Quit date:      Years since quittin.4    Smokeless tobacco: Never Used   Substance Use Topics    Alcohol use: Not Currently       ROS    Objective:   No flowsheet data found.      General: alert, cooperative, no distress   Mental  status: normal mood, behavior, speech, dress, motor activity, and thought processes, able to follow commands   HENT: NCAT   Neck: no visualized mass   Resp: no respiratory distress   Neuro: no gross deficits   Skin: no discoloration or lesions of concern on visible areas   Psychiatric: normal affect, consistent with stated mood, no evidence of hallucinations       We discussed the expected course, resolution and complications of the diagnosis(es) in detail. Medication risks, benefits, costs, interactions, and alternatives were discussed as indicated. I advised her to contact the office if her condition worsens, changes or fails to improve as anticipated. She expressed understanding with the diagnosis(es) and plan. Jose Eduardo James, who was evaluated through a patient-initiated, synchronous (real-time) audio-video encounter, and/or her healthcare decision maker, is aware that it is a billable service, with coverage as determined by her insurance carrier. She provided verbal consent to proceed: Yes, and patient identification was verified. It was conducted pursuant to the emergency declaration under the [address] [place] [address] authority and the [employer] Act. A caregiver was present when appropriate. Ability to conduct physical exam was limited. I was in the office. The patient was at home.       Marlin Ferrara MD

## 2021-06-15 DIAGNOSIS — M19.90 ARTHRITIS: ICD-10-CM

## 2021-06-15 DIAGNOSIS — F33.1 MODERATE EPISODE OF RECURRENT MAJOR DEPRESSIVE DISORDER (HCC): ICD-10-CM

## 2021-06-15 DIAGNOSIS — I10 ESSENTIAL HYPERTENSION: ICD-10-CM

## 2021-06-15 DIAGNOSIS — G47.00 INSOMNIA, UNSPECIFIED TYPE: ICD-10-CM

## 2021-06-17 RX ORDER — ESZOPICLONE 2 MG/1
TABLET, FILM COATED ORAL
Qty: 90 TABLET | Refills: 0 | Status: SHIPPED | OUTPATIENT
Start: 2021-06-17 | End: 2021-09-22 | Stop reason: SDUPTHER

## 2021-06-17 RX ORDER — DULOXETIN HYDROCHLORIDE 30 MG/1
CAPSULE, DELAYED RELEASE ORAL
Qty: 90 CAPSULE | Refills: 0 | Status: SHIPPED | OUTPATIENT
Start: 2021-06-17 | End: 2021-09-22 | Stop reason: SDUPTHER

## 2021-06-17 RX ORDER — ATENOLOL 50 MG/1
TABLET ORAL
Qty: 90 TABLET | Refills: 0 | Status: SHIPPED | OUTPATIENT
Start: 2021-06-17 | End: 2021-09-22 | Stop reason: SDUPTHER

## 2021-09-22 ENCOUNTER — OFFICE VISIT (OUTPATIENT)
Dept: FAMILY MEDICINE CLINIC | Age: 66
End: 2021-09-22
Payer: MEDICARE

## 2021-09-22 VITALS
OXYGEN SATURATION: 94 % | BODY MASS INDEX: 27.82 KG/M2 | RESPIRATION RATE: 18 BRPM | SYSTOLIC BLOOD PRESSURE: 120 MMHG | HEIGHT: 65 IN | TEMPERATURE: 97.6 F | DIASTOLIC BLOOD PRESSURE: 70 MMHG | WEIGHT: 167 LBS | HEART RATE: 62 BPM

## 2021-09-22 DIAGNOSIS — M19.90 ARTHRITIS: ICD-10-CM

## 2021-09-22 DIAGNOSIS — R73.03 PREDIABETES: ICD-10-CM

## 2021-09-22 DIAGNOSIS — I10 ESSENTIAL HYPERTENSION: Primary | ICD-10-CM

## 2021-09-22 DIAGNOSIS — Z13.220 SCREENING FOR CHOLESTEROL LEVEL: ICD-10-CM

## 2021-09-22 DIAGNOSIS — F33.1 MODERATE EPISODE OF RECURRENT MAJOR DEPRESSIVE DISORDER (HCC): ICD-10-CM

## 2021-09-22 DIAGNOSIS — G47.00 INSOMNIA, UNSPECIFIED TYPE: ICD-10-CM

## 2021-09-22 LAB
ALBUMIN SERPL-MCNC: 3.8 G/DL (ref 3.5–5)
ALBUMIN/GLOB SERPL: 1.3 {RATIO} (ref 1.1–2.2)
ALP SERPL-CCNC: 67 U/L (ref 45–117)
ALT SERPL-CCNC: 29 U/L (ref 12–78)
ANION GAP SERPL CALC-SCNC: 3 MMOL/L (ref 5–15)
AST SERPL-CCNC: 26 U/L (ref 15–37)
BILIRUB SERPL-MCNC: 0.4 MG/DL (ref 0.2–1)
BUN SERPL-MCNC: 18 MG/DL (ref 6–20)
BUN/CREAT SERPL: 21 (ref 12–20)
CALCIUM SERPL-MCNC: 9.4 MG/DL (ref 8.5–10.1)
CHLORIDE SERPL-SCNC: 111 MMOL/L (ref 97–108)
CHOLEST SERPL-MCNC: 223 MG/DL
CO2 SERPL-SCNC: 28 MMOL/L (ref 21–32)
CREAT SERPL-MCNC: 0.84 MG/DL (ref 0.55–1.02)
ERYTHROCYTE [DISTWIDTH] IN BLOOD BY AUTOMATED COUNT: 12.1 % (ref 11.5–14.5)
EST. AVERAGE GLUCOSE BLD GHB EST-MCNC: 111 MG/DL
GLOBULIN SER CALC-MCNC: 2.9 G/DL (ref 2–4)
GLUCOSE SERPL-MCNC: 117 MG/DL (ref 65–100)
HBA1C MFR BLD: 5.5 % (ref 4–5.6)
HCT VFR BLD AUTO: 44.7 % (ref 35–47)
HDLC SERPL-MCNC: 72 MG/DL
HDLC SERPL: 3.1 {RATIO} (ref 0–5)
HGB BLD-MCNC: 14.3 G/DL (ref 11.5–16)
LDLC SERPL CALC-MCNC: 126.2 MG/DL (ref 0–100)
MCH RBC QN AUTO: 30.7 PG (ref 26–34)
MCHC RBC AUTO-ENTMCNC: 32 G/DL (ref 30–36.5)
MCV RBC AUTO: 95.9 FL (ref 80–99)
NRBC # BLD: 0 K/UL (ref 0–0.01)
NRBC BLD-RTO: 0 PER 100 WBC
PLATELET # BLD AUTO: 187 K/UL (ref 150–400)
PMV BLD AUTO: 11.5 FL (ref 8.9–12.9)
POTASSIUM SERPL-SCNC: 4.9 MMOL/L (ref 3.5–5.1)
PROT SERPL-MCNC: 6.7 G/DL (ref 6.4–8.2)
RBC # BLD AUTO: 4.66 M/UL (ref 3.8–5.2)
SODIUM SERPL-SCNC: 142 MMOL/L (ref 136–145)
TRIGL SERPL-MCNC: 124 MG/DL (ref ?–150)
VLDLC SERPL CALC-MCNC: 24.8 MG/DL
WBC # BLD AUTO: 5.4 K/UL (ref 3.6–11)

## 2021-09-22 PROCEDURE — G9899 SCRN MAM PERF RSLTS DOC: HCPCS | Performed by: NURSE PRACTITIONER

## 2021-09-22 PROCEDURE — 3017F COLORECTAL CA SCREEN DOC REV: CPT | Performed by: NURSE PRACTITIONER

## 2021-09-22 PROCEDURE — G8754 DIAS BP LESS 90: HCPCS | Performed by: NURSE PRACTITIONER

## 2021-09-22 PROCEDURE — G8536 NO DOC ELDER MAL SCRN: HCPCS | Performed by: NURSE PRACTITIONER

## 2021-09-22 PROCEDURE — G8427 DOCREV CUR MEDS BY ELIG CLIN: HCPCS | Performed by: NURSE PRACTITIONER

## 2021-09-22 PROCEDURE — G9717 DOC PT DX DEP/BP F/U NT REQ: HCPCS | Performed by: NURSE PRACTITIONER

## 2021-09-22 PROCEDURE — 1101F PT FALLS ASSESS-DOCD LE1/YR: CPT | Performed by: NURSE PRACTITIONER

## 2021-09-22 PROCEDURE — 1090F PRES/ABSN URINE INCON ASSESS: CPT | Performed by: NURSE PRACTITIONER

## 2021-09-22 PROCEDURE — 99214 OFFICE O/P EST MOD 30 MIN: CPT | Performed by: NURSE PRACTITIONER

## 2021-09-22 PROCEDURE — G8752 SYS BP LESS 140: HCPCS | Performed by: NURSE PRACTITIONER

## 2021-09-22 PROCEDURE — G8419 CALC BMI OUT NRM PARAM NOF/U: HCPCS | Performed by: NURSE PRACTITIONER

## 2021-09-22 PROCEDURE — G8399 PT W/DXA RESULTS DOCUMENT: HCPCS | Performed by: NURSE PRACTITIONER

## 2021-09-22 RX ORDER — CELECOXIB 200 MG/1
200 CAPSULE ORAL
Qty: 90 CAPSULE | Refills: 1 | Status: SHIPPED | OUTPATIENT
Start: 2021-09-22 | End: 2022-03-16

## 2021-09-22 RX ORDER — ESZOPICLONE 2 MG/1
2 TABLET, FILM COATED ORAL
Qty: 90 TABLET | Refills: 1 | Status: SHIPPED | OUTPATIENT
Start: 2021-09-22 | End: 2022-03-16

## 2021-09-22 RX ORDER — DULOXETIN HYDROCHLORIDE 30 MG/1
30 CAPSULE, DELAYED RELEASE ORAL DAILY
Qty: 90 CAPSULE | Refills: 1 | Status: SHIPPED | OUTPATIENT
Start: 2021-09-22 | End: 2022-03-16

## 2021-09-22 RX ORDER — ATENOLOL 50 MG/1
50 TABLET ORAL DAILY
Qty: 90 TABLET | Refills: 1 | Status: SHIPPED | OUTPATIENT
Start: 2021-09-22 | End: 2022-03-16

## 2021-09-22 NOTE — PROGRESS NOTES
Subjective:     Yadi Thomas is a 77 y.o. female who presents for follow up of hypertension. Diet and Lifestyle: generally follows a low fat low cholesterol diet  Home BP Monitoring: is not measured at home    Cardiovascular ROS: taking medications as instructed, no medication side effects noted, no TIA's, no chest pain on exertion, no dyspnea on exertion, no swelling of ankles. New concerns: Pt is here for refills and fasting labs. She denies concerns or complaints at this time. Patient Active Problem List    Diagnosis Date Noted    Moderate episode of recurrent major depressive disorder ([facility] [address] 05/11/2021    Gastroparesis     Arthritis     Insomnia     Depression     HTN (hypertension)      Current Outpatient Medications   Medication Sig Dispense Refill    atenoloL (TENORMIN) 50 mg tablet Take 1 Tablet by mouth daily. 90 Tablet 1    celecoxib (CELEBREX) 200 mg capsule Take 1 Capsule by mouth daily as needed for Pain. 90 Capsule 1    DULoxetine (CYMBALTA) 30 mg capsule Take 1 Capsule by mouth daily. 90 Capsule 1    eszopiclone (LUNESTA) 2 mg tablet Take 1 Tablet by mouth nightly. 90 Tablet 1    triamcinolone acetonide (KENALOG) 0.1 % topical cream Apply  to affected area two (2) times a day. use thin layer 45 g 0    tiZANidine (ZANAFLEX) 2 mg tablet 1-2 tablets, 1-2 hours prior to bedtime.  (Patient not taking: Reported on 9/22/2021) 30 Tab 0     Allergies   Allergen Reactions    Corn Starch Rash    Pcn [Penicillins] Rash     Past Medical History:   Diagnosis Date    Arthritis     Depression     Gastroparesis     Hip fracture ([facility] [address]     March, right    HTN (hypertension)     Insomnia     Menopause     LMP-30years old     Past Surgical History:   Procedure Laterality Date    HX APPENDECTOMY      HX BILATERAL SALPINGO-OOPHORECTOMY      HX CARPAL TUNNEL RELEASE Right 2003    HX DILATION AND CURETTAGE      HX HYSTERECTOMY      LMP-30years old   [facility] repairs on both knees/Lateral Release    HX SHOULDER ARTHROSCOPY       Family History   Problem Relation Age of Onset    Diabetes Mother     Heart Disease Mother     COPD Mother     Hypertension Father     Kidney Disease Father     Heart Attack Brother     Breast Cancer Cousin         Also had ovarian cancer--age unknown--maternal     Breast Cancer Cousin         age unknown--maternal    Breast Cancer Cousin         age unknown--maternal     Social History     Tobacco Use    Smoking status: Former Smoker     Packs/day: 0.50     Quit date:      Years since quittin.7    Smokeless tobacco: Never Used   Substance Use Topics    Alcohol use: Not Currently        Lab Results   Component Value Date/Time    WBC 5.3 2021 12:37 PM    HGB 14.4 2021 12:37 PM    HCT 43.7 2021 12:37 PM    PLATELET 974  12:37 PM    MCV 93.0 2021 12:37 PM     Lab Results   Component Value Date/Time    Cholesterol, total 224 (H) 2021 12:37 PM    HDL Cholesterol 75 2021 12:37 PM    LDL, calculated 129.4 (H) 2021 12:37 PM    Triglyceride 98 2021 12:37 PM    CHOL/HDL Ratio 3.0 2021 12:37 PM     Lab Results   Component Value Date/Time    GFR est non-AA >60 2021 12:37 PM    GFR est AA >60 2021 12:37 PM    Creatinine 0.77 2021 12:37 PM    BUN 21 (H) 2021 12:37 PM    Sodium 141 2021 12:37 PM    Potassium 4.5 2021 12:37 PM    Chloride 109 (H) 2021 12:37 PM    CO2 26 2021 12:37 PM        Review of Systems, additional:  Pertinent items are noted in HPI. Objective:     Visit Vitals  /70 (BP 1 Location: Right arm, BP Patient Position: Sitting, BP Cuff Size: Adult)   Pulse 62   Temp 97.6 °F (36.4 °C) (Temporal)   Resp 18   Ht 5' 5\" (1.651 m)   Wt 167 lb (75.8 kg)   SpO2 94%   BMI 27.79 kg/m²     Appearance: alert, well appearing, and in no distress.   General exam: CVS exam BP noted to be well controlled today in office, S1, S2 normal, no gallop, no murmur, chest clear, no JVD, no HSM, no edema. Lab review: orders written for new lab studies as appropriate; see orders. Assessment/Plan:     hypertension well controlled. current treatment plan is effective, no change in therapy. ICD-10-CM ICD-9-CM    1. Essential hypertension  I10 401.9 atenoloL (TENORMIN) 50 mg tablet      CBC W/O DIFF      METABOLIC PANEL, COMPREHENSIVE   2. Arthritis  M19.90 716.90 celecoxib (CELEBREX) 200 mg capsule      DULoxetine (CYMBALTA) 30 mg capsule   3. Moderate episode of recurrent major depressive disorder (HCC)  F33.1 296.32 DULoxetine (CYMBALTA) 30 mg capsule   4. Insomnia, unspecified type  G47.00 780.52 eszopiclone (LUNESTA) 2 mg tablet   5. Prediabetes  R73.03 790.29 HEMOGLOBIN A1C WITH EAG   6. Screening for cholesterol level  Z13.220 V77.91 LIPID PANEL     Educated about taking medication as prescribed  Will notify when labs return, and inform her of any change in plan of care at that time    Pt informed to return to office with worsening of symptoms, or PRN with any questions or concerns. Pt verbalizes understanding of plan of care and denies further questions or concerns at this time.

## 2021-09-22 NOTE — PROGRESS NOTES
Identified pt with two pt identifiers(name and ). Chief Complaint   Patient presents with    Medication Evaluation    Labs     fasting        Health Maintenance Due   Topic    Colorectal Cancer Screening Combo     Shingrix Vaccine Age 50> (1 of 2)    Flu Vaccine (1)       Wt Readings from Last 3 Encounters:   21 167 lb (75.8 kg)   21 165 lb (74.8 kg)   21 165 lb (74.8 kg)     Temp Readings from Last 3 Encounters:   21 97.6 °F (36.4 °C) (Temporal)   21 97.9 °F (36.6 °C) (Temporal)   21 98 °F (36.7 °C) (Oral)     BP Readings from Last 3 Encounters:   21 120/70   21 126/62   21 118/62     Pulse Readings from Last 3 Encounters:   21 62   21 (!) 55   21 (!) 53         Learning Assessment:  :     Learning Assessment 2020   PRIMARY LEARNER Patient   HIGHEST LEVEL OF EDUCATION - PRIMARY LEARNER  SOME COLLEGE   BARRIERS PRIMARY LEARNER NONE   CO-LEARNER CAREGIVER No   PRIMARY LANGUAGE ENGLISH   LEARNER PREFERENCE PRIMARY LISTENING     DEMONSTRATION   ANSWERED BY patient   RELATIONSHIP SELF       Depression Screening:  :     3 most recent PHQ Screens 2021   Little interest or pleasure in doing things Not at all   Feeling down, depressed, irritable, or hopeless Not at all   Total Score PHQ 2 0       Fall Risk Assessment:  :     No flowsheet data found. Abuse Screening:  :     Abuse Screening Questionnaire 2020   Do you ever feel afraid of your partner? N N N   Are you in a relationship with someone who physically or mentally threatens you? N N N   Is it safe for you to go home?  Merline Poster       Coordination of Care Questionnaire:  :     1) Have you been to an emergency room, urgent care clinic since your last visit? no   Hospitalized since your last visit? no             2) Have you seen or consulted any other health care providers outside of [address] [place] [address] since your last visit? no  (Include any pap smears or colon screenings in this section.)    3) Do you have an Advance Directive on file? no  Are you interested in receiving information about Advance Directives? no      Reviewed record in preparation for visit and have obtained necessary documentation.

## 2021-09-22 NOTE — PATIENT INSTRUCTIONS
Cholesterol and Triglycerides Tests: About These Tests  What are they? Cholesterol and triglycerides tests measure the amount of fats in your blood. These fats have both \"good\" (HDL) and \"bad\" (LDL) cholesterol. Why are these tests done? These tests are done to help find out your risk of a heart attack and stroke. Your doctor uses your cholesterol levels plus other things such as blood pressure, age, and sex to calculate your risk. These tests also help your doctor find out how well medicine is working for some health problems. How do you prepare for these tests? · Your doctor may ask you to not eat or drink anything except water for 9 to 14 hours before the tests. In most cases, you can take your medicines with water the morning of the test.  · Do not drink alcohol for 24 hours before the tests. · Tell your doctor ALL the medicines, vitamins, supplements, and herbal remedies you take. Some may increase the risk of problems during your test. Your doctor will tell you if you should stop taking any of them before the test and how soon to do it. How are these tests done? A health professional uses a needle to take a blood sample, usually from the arm. Where can you learn more? Go to http://www.gray.com/  Enter V788 in the search box to learn more about \"Cholesterol and Triglycerides Tests: About These Tests. \"  Current as of: April 29, 2021               Content Version: 13.0  © 2006-2021 Healthwise, Incorporated. Care instructions adapted under license by [employer] (which disclaims liability or warranty for this information). If you have questions about a medical condition or this instruction, always ask your healthcare professional. [place] [address] any warranty or liability for your use of this information. Cholesterol and Triglycerides Tests: About These Tests  What are they?      Cholesterol and triglycerides tests measure the amount of fats in your blood. These fats have both \"good\" (HDL) and \"bad\" (LDL) cholesterol. Why are these tests done? These tests are done to help find out your risk of a heart attack and stroke. Your doctor uses your cholesterol levels plus other things such as blood pressure, age, and sex to calculate your risk. These tests also help your doctor find out how well medicine is working for some health problems. How do you prepare for these tests? · Your doctor may ask you to not eat or drink anything except water for 9 to 14 hours before the tests. In most cases, you can take your medicines with water the morning of the test.  · Do not drink alcohol for 24 hours before the tests. · Tell your doctor ALL the medicines, vitamins, supplements, and herbal remedies you take. Some may increase the risk of problems during your test. Your doctor will tell you if you should stop taking any of them before the test and how soon to do it. How are these tests done? A health professional uses a needle to take a blood sample, usually from the arm. Where can you learn more? Go to http://junior-christal.info/  Enter V788 in the search box to learn more about \"Cholesterol and Triglycerides Tests: About These Tests. \"  Current as of: April 29, 2021               Content Version: 13.0  © 2006-2021 Healthwise, Incorporated. Care instructions adapted under license by [employer] (which disclaims liability or warranty for this information). If you have questions about a medical condition or this instruction, always ask your healthcare professional. [place] [address] any warranty or liability for your use of this information.

## 2021-09-23 ENCOUNTER — TELEPHONE (OUTPATIENT)
Dept: FAMILY MEDICINE CLINIC | Age: 66
End: 2021-09-23

## 2021-09-23 RX ORDER — ATORVASTATIN CALCIUM 10 MG/1
10 TABLET, FILM COATED ORAL DAILY
Qty: 90 TABLET | Refills: 1 | Status: SHIPPED | OUTPATIENT
Start: 2021-09-23 | End: 2022-09-16

## 2021-09-23 NOTE — TELEPHONE ENCOUNTER
----- Message from Aries Camarena NP sent at 9/23/2021  7:52 AM EDT -----  Please call patient and let her know that her labs returned:  Cholesterol is still elevated. I have sent in low dose statin, and she should take as prescribed.   Should return to office in 6 months for office visit and fasting labs  Thanks

## 2021-09-23 NOTE — PROGRESS NOTES
Please call patient and let her know that her labs returned:  Cholesterol is still elevated. I have sent in low dose statin, and she should take as prescribed.   Should return to office in 6 months for office visit and fasting labs  Thanks

## 2022-01-14 ENCOUNTER — OFFICE VISIT (OUTPATIENT)
Dept: FAMILY MEDICINE CLINIC | Age: 67
End: 2022-01-14
Payer: MEDICARE

## 2022-01-14 VITALS
DIASTOLIC BLOOD PRESSURE: 70 MMHG | SYSTOLIC BLOOD PRESSURE: 122 MMHG | TEMPERATURE: 97.8 F | OXYGEN SATURATION: 96 % | HEIGHT: 65 IN | HEART RATE: 73 BPM | WEIGHT: 170 LBS | RESPIRATION RATE: 16 BRPM | BODY MASS INDEX: 28.32 KG/M2

## 2022-01-14 DIAGNOSIS — R10.11 RIGHT UPPER QUADRANT ABDOMINAL PAIN: Primary | ICD-10-CM

## 2022-01-14 PROCEDURE — G8427 DOCREV CUR MEDS BY ELIG CLIN: HCPCS | Performed by: NURSE PRACTITIONER

## 2022-01-14 PROCEDURE — G8399 PT W/DXA RESULTS DOCUMENT: HCPCS | Performed by: NURSE PRACTITIONER

## 2022-01-14 PROCEDURE — 99213 OFFICE O/P EST LOW 20 MIN: CPT | Performed by: NURSE PRACTITIONER

## 2022-01-14 PROCEDURE — G8754 DIAS BP LESS 90: HCPCS | Performed by: NURSE PRACTITIONER

## 2022-01-14 PROCEDURE — 1101F PT FALLS ASSESS-DOCD LE1/YR: CPT | Performed by: NURSE PRACTITIONER

## 2022-01-14 PROCEDURE — 1090F PRES/ABSN URINE INCON ASSESS: CPT | Performed by: NURSE PRACTITIONER

## 2022-01-14 PROCEDURE — G9717 DOC PT DX DEP/BP F/U NT REQ: HCPCS | Performed by: NURSE PRACTITIONER

## 2022-01-14 PROCEDURE — G9899 SCRN MAM PERF RSLTS DOC: HCPCS | Performed by: NURSE PRACTITIONER

## 2022-01-14 PROCEDURE — 3017F COLORECTAL CA SCREEN DOC REV: CPT | Performed by: NURSE PRACTITIONER

## 2022-01-14 PROCEDURE — G8419 CALC BMI OUT NRM PARAM NOF/U: HCPCS | Performed by: NURSE PRACTITIONER

## 2022-01-14 PROCEDURE — G8536 NO DOC ELDER MAL SCRN: HCPCS | Performed by: NURSE PRACTITIONER

## 2022-01-14 PROCEDURE — G8752 SYS BP LESS 140: HCPCS | Performed by: NURSE PRACTITIONER

## 2022-01-14 NOTE — PROGRESS NOTES
Identified pt with two pt identifiers(name and ). Chief Complaint   Patient presents with    Abdominal Pain     pain in upper right         Health Maintenance Due   Topic    Colorectal Cancer Screening Combo     Shingrix Vaccine Age 49> (1 of 2)    Pneumococcal 65+ years (2 of 2 - PPSV23)    Medicare Yearly Exam        Wt Readings from Last 3 Encounters:   22 170 lb (77.1 kg)   21 167 lb (75.8 kg)   21 165 lb (74.8 kg)     Temp Readings from Last 3 Encounters:   22 97.8 °F (36.6 °C) (Temporal)   21 97.6 °F (36.4 °C) (Temporal)   21 97.9 °F (36.6 °C) (Temporal)     BP Readings from Last 3 Encounters:   22 122/70   21 120/70   21 126/62     Pulse Readings from Last 3 Encounters:   22 73   21 62   21 (!) 55         Learning Assessment:  :     Learning Assessment 2020   PRIMARY LEARNER Patient   HIGHEST LEVEL OF EDUCATION - PRIMARY LEARNER  SOME COLLEGE   BARRIERS PRIMARY LEARNER NONE   CO-LEARNER CAREGIVER No   PRIMARY LANGUAGE ENGLISH   LEARNER PREFERENCE PRIMARY LISTENING     DEMONSTRATION   ANSWERED BY patient   RELATIONSHIP SELF       Depression Screening:  :     3 most recent PHQ Screens 2022   Little interest or pleasure in doing things Not at all   Feeling down, depressed, irritable, or hopeless Not at all   Total Score PHQ 2 0       Fall Risk Assessment:  :     No flowsheet data found. Abuse Screening:  :     Abuse Screening Questionnaire 2020   Do you ever feel afraid of your partner? N N N N   Are you in a relationship with someone who physically or mentally threatens you? N N N N   Is it safe for you to go home?  Y Y Y Y       Coordination of Care Questionnaire:  :     1) Have you been to an emergency room, urgent care clinic since your last visit? no   Hospitalized since your last visit? no             2) Have you seen or consulted any other health care providers outside of [facility] [address] [place] [address] since your last visit? no  (Include any pap smears or colon screenings in this section.)    3) Do you have an Advance Directive on file? no  Are you interested in receiving information about Advance Directives? no      Reviewed record in preparation for visit and have obtained necessary documentation.

## 2022-01-14 NOTE — PROGRESS NOTES
HISTORY OF PRESENT ILLNESS  Piedad Salomon is a 74 y.o. female. HPI   Pt presents with \"abdominal pain\"  Pt states that she has had abdominal pain in right upper abdomen off and on for about a year  It has become much more frequent recently  She states that the pain will come and go  It feels sharp, but then will linger  At times, it does radiate to her back  She is nauseated at times with the pain  No vomiting  No change in bowel habits  Eating seems to make the pain worse at times  Sometimes the area is tender to the touch  Review of Systems   Constitutional: Negative for fever. Gastrointestinal: Positive for abdominal pain and nausea. Physical Exam  Constitutional:       Appearance: Normal appearance. Cardiovascular:      Rate and Rhythm: Normal rate and regular rhythm. Heart sounds: Normal heart sounds. Pulmonary:      Effort: Pulmonary effort is normal.      Breath sounds: Normal breath sounds. Abdominal:      General: Abdomen is flat. Bowel sounds are normal.      Palpations: Abdomen is soft. Neurological:      Mental Status: She is alert. ASSESSMENT and PLAN    ICD-10-CM ICD-9-CM    1. Right upper quadrant abdominal pain  R10.11 789.01  ABD LTD     Will notify when US returns, and inform her of any change in plan of care at that time    Pt informed to return to office with worsening of symptoms, or PRN with any questions or concerns. Pt verbalizes understanding of plan of care and denies further questions or concerns at this time.

## 2022-01-18 ENCOUNTER — TELEPHONE (OUTPATIENT)
Dept: FAMILY MEDICINE CLINIC | Age: 67
End: 2022-01-18

## 2022-01-18 ENCOUNTER — HOSPITAL ENCOUNTER (OUTPATIENT)
Dept: ULTRASOUND IMAGING | Age: 67
Discharge: HOME | End: 2022-01-18
Attending: NURSE PRACTITIONER
Payer: MEDICARE

## 2022-01-18 DIAGNOSIS — R10.11 RIGHT UPPER QUADRANT ABDOMINAL PAIN: ICD-10-CM

## 2022-01-18 PROCEDURE — 76705 ECHO EXAM OF ABDOMEN: CPT

## 2022-01-18 NOTE — PROGRESS NOTES
Please call patient and let her know that her US returned stable.   Should pain persist, would advise GI follow up   Thanks

## 2022-01-18 NOTE — TELEPHONE ENCOUNTER
----- Message from Marie Bedoya NP sent at 1/18/2022  8:57 AM EST -----  Please call patient and let her know that her US returned stable.   Should pain persist, would advise GI follow up   Thanks

## 2022-01-19 DIAGNOSIS — R10.11 RIGHT UPPER QUADRANT ABDOMINAL PAIN: Primary | ICD-10-CM

## 2022-02-04 ENCOUNTER — TELEPHONE (OUTPATIENT)
Dept: FAMILY MEDICINE CLINIC | Age: 67
End: 2022-02-04

## 2022-02-04 NOTE — TELEPHONE ENCOUNTER
Called pt and advised her that she is overdue for MWV. Advised her that the front would give her a call to schedule this.

## 2022-02-24 ENCOUNTER — OFFICE VISIT (OUTPATIENT)
Dept: FAMILY MEDICINE CLINIC | Age: 67
End: 2022-02-24
Payer: MEDICARE

## 2022-02-24 VITALS
OXYGEN SATURATION: 96 % | RESPIRATION RATE: 18 BRPM | HEART RATE: 58 BPM | DIASTOLIC BLOOD PRESSURE: 74 MMHG | HEIGHT: 65 IN | TEMPERATURE: 96.1 F | SYSTOLIC BLOOD PRESSURE: 110 MMHG | WEIGHT: 169 LBS | BODY MASS INDEX: 28.16 KG/M2

## 2022-02-24 DIAGNOSIS — Z00.00 MEDICARE ANNUAL WELLNESS VISIT, SUBSEQUENT: Primary | ICD-10-CM

## 2022-02-24 DIAGNOSIS — F33.1 MODERATE EPISODE OF RECURRENT MAJOR DEPRESSIVE DISORDER (HCC): ICD-10-CM

## 2022-02-24 DIAGNOSIS — R05.9 COUGH: ICD-10-CM

## 2022-02-24 DIAGNOSIS — Z23 ENCOUNTER FOR IMMUNIZATION: ICD-10-CM

## 2022-02-24 PROCEDURE — G9899 SCRN MAM PERF RSLTS DOC: HCPCS | Performed by: NURSE PRACTITIONER

## 2022-02-24 PROCEDURE — G8754 DIAS BP LESS 90: HCPCS | Performed by: NURSE PRACTITIONER

## 2022-02-24 PROCEDURE — G0009 ADMIN PNEUMOCOCCAL VACCINE: HCPCS | Performed by: NURSE PRACTITIONER

## 2022-02-24 PROCEDURE — G8752 SYS BP LESS 140: HCPCS | Performed by: NURSE PRACTITIONER

## 2022-02-24 PROCEDURE — G8399 PT W/DXA RESULTS DOCUMENT: HCPCS | Performed by: NURSE PRACTITIONER

## 2022-02-24 PROCEDURE — 90732 PPSV23 VACC 2 YRS+ SUBQ/IM: CPT | Performed by: NURSE PRACTITIONER

## 2022-02-24 PROCEDURE — 3017F COLORECTAL CA SCREEN DOC REV: CPT | Performed by: NURSE PRACTITIONER

## 2022-02-24 PROCEDURE — G8427 DOCREV CUR MEDS BY ELIG CLIN: HCPCS | Performed by: NURSE PRACTITIONER

## 2022-02-24 PROCEDURE — 1101F PT FALLS ASSESS-DOCD LE1/YR: CPT | Performed by: NURSE PRACTITIONER

## 2022-02-24 PROCEDURE — G0439 PPPS, SUBSEQ VISIT: HCPCS | Performed by: NURSE PRACTITIONER

## 2022-02-24 PROCEDURE — G9717 DOC PT DX DEP/BP F/U NT REQ: HCPCS | Performed by: NURSE PRACTITIONER

## 2022-02-24 PROCEDURE — G8419 CALC BMI OUT NRM PARAM NOF/U: HCPCS | Performed by: NURSE PRACTITIONER

## 2022-02-24 PROCEDURE — G8536 NO DOC ELDER MAL SCRN: HCPCS | Performed by: NURSE PRACTITIONER

## 2022-02-24 RX ORDER — ALBUTEROL SULFATE 90 UG/1
2 AEROSOL, METERED RESPIRATORY (INHALATION)
Qty: 18 G | Refills: 0 | Status: SHIPPED | OUTPATIENT
Start: 2022-02-24 | End: 2022-09-16

## 2022-02-24 NOTE — PATIENT INSTRUCTIONS
Vaccine Information Statement    Pneumococcal Polysaccharide Vaccine (PPSV23): What You Need to Know    Many Vaccine Information Statements are available in Georgian and other languages. See www.immunize.org/vis  Hojas de información sobre vacunas están disponibles en español y en muchos otros idiomas. Visite www.immunize.org/vis    1. Why get vaccinated? Pneumococcal polysaccharide vaccine (PPSV23) can prevent pneumococcal disease. Pneumococcal disease refers to any illness caused by pneumococcal bacteria. These bacteria can cause many types of illnesses, including pneumonia, which is an infection of the lungs. Pneumococcal bacteria are one of the most common causes of pneumonia. Besides pneumonia, pneumococcal bacteria can also cause:   Ear infections   Sinus infections   Meningitis (infection of the tissue covering the brain and spinal cord)   Bacteremia (bloodstream infection)    Anyone can get pneumococcal disease, but children under 3years of age, people with certain medical conditions, adults 72 years or older, and cigarette smokers are at the highest risk. Most pneumococcal infections are mild. However, some can result in long-term problems, such as brain damage or hearing loss. Meningitis, bacteremia, and pneumonia caused by pneumococcal disease can be fatal.     2. PPSV23     PPSV23 protects against 23 types of bacteria that cause pneumococcal disease. PPSV23 is recommended for:   All adults 72 years or older,   Anyone 2 years or older with certain medical conditions that can lead to an increased risk for pneumococcal disease. Most people need only one dose of PPSV23. A second dose of PPSV23, and another type of pneumococcal vaccine called PCV13, are recommended for certain high-risk groups. Your health care provider can give you more information.     People 65 years or older should get a dose of PPSV23 even if they have already gotten one or more doses of the vaccine before they turned 72.    3. Talk with your health care provider    Tell your vaccine provider if the person getting the vaccine:   Has had an allergic reaction after a previous dose of PPSV23, or has any severe, life-threatening allergies. In some cases, your health care provider may decide to postpone PPSV23 vaccination to a future visit. People with minor illnesses, such as a cold, may be vaccinated. People who are moderately or severely ill should usually wait until they recover before getting PPSV23. Your health care provider can give you more information. 4. Risks of a vaccine reaction     Redness or pain where the shot is given, feeling tired, fever, or muscle aches can happen after PPSV23. People sometimes faint after medical procedures, including vaccination. Tell your provider if you feel dizzy or have vision changes or ringing in the ears. As with any medicine, there is a very remote chance of a vaccine causing a severe allergic reaction, other serious injury, or death. 5. What if there is a serious problem? An allergic reaction could occur after the vaccinated person leaves the clinic. If you see signs of a severe allergic reaction (hives, swelling of the face and throat, difficulty breathing, a fast heartbeat, dizziness, or weakness), call 9-1-1 and get the person to the nearest hospital.    For other signs that concern you, call your health care provider. Adverse reactions should be reported to the Vaccine Adverse Event Reporting System (VAERS). Your health care provider will usually file this report, or you can do it yourself. Visit the VAERS website at www.vaers. hhs.gov or call 7-949.386.8053. VAERS is only for reporting reactions, and VAERS staff do not give medical advice. 6. How can I learn more?  Ask your health care provider.  Call your local or state health department.    Contact the Centers for Disease Control and Prevention (CDC):  - Call 1-454.725.1879 (3-571-USB-INFO) or  - Visit CDCs website at www.cdc.gov/vaccines    Vaccine Information Statement   PPSV23   10/30/2019    [facility] for Disease Control and Prevention    Office Use Only

## 2022-02-24 NOTE — PROGRESS NOTES
Date of visit: 2/24/2022       This is a Subsequent Medicare Annual Wellness Visit (AWV), (Performed more than 12 months after effective date of Medicare Part B enrollment and 12 months after last preventive visit, Once in a lifetime)    I have reviewed the patient's medical history in detail and updated the computerized patient record. Jamaal Garzon is a 74 y.o. female   History obtained from: the patient. Concerns today   (Patient understands that medical problems addressed today may incur additional cost as this is a preventive visit)      History     Patient Active Problem List   Diagnosis Code    Gastroparesis K31.84    Arthritis M19.90    Insomnia G47.00    Depression F32. A    HTN (hypertension) I10    Moderate episode of recurrent major depressive disorder ([facility] [address] F33.1     Past Medical History:   Diagnosis Date    Arthritis     Depression     Gastroparesis     Hip fracture ([facility] [address]     March, right    HTN (hypertension)     Insomnia     Menopause     LMP-30years old      Past Surgical History:   Procedure Laterality Date    HX APPENDECTOMY      HX BILATERAL SALPINGO-OOPHORECTOMY      HX CARPAL TUNNEL RELEASE Right 2003    HX DILATION AND CURETTAGE      HX HYSTERECTOMY      LMP-30years old    HX OTHER SURGICAL      Surgery repairs on both knees/Lateral Release    HX SHOULDER ARTHROSCOPY       Allergies   Allergen Reactions    Corn Starch Rash    Pcn [Penicillins] Rash     Current Outpatient Medications   Medication Sig Dispense Refill    atorvastatin (LIPITOR) 10 mg tablet Take 1 Tablet by mouth daily. 90 Tablet 1    atenoloL (TENORMIN) 50 mg tablet Take 1 Tablet by mouth daily. 90 Tablet 1    celecoxib (CELEBREX) 200 mg capsule Take 1 Capsule by mouth daily as needed for Pain. 90 Capsule 1    DULoxetine (CYMBALTA) 30 mg capsule Take 1 Capsule by mouth daily. 90 Capsule 1    eszopiclone (LUNESTA) 2 mg tablet Take 1 Tablet by mouth nightly.  90 Tablet 1    triamcinolone acetonide (KENALOG) 0.1 % topical cream Apply  to affected area two (2) times a day. use thin layer 45 g 0    tiZANidine (ZANAFLEX) 2 mg tablet 1-2 tablets, 1-2 hours prior to bedtime. (Patient not taking: Reported on 2021) [address] [address] Tab 0     Family History   Problem Relation Age of Onset    Diabetes Mother     Heart Disease Mother     COPD Mother     Hypertension Father     Kidney Disease Father     Heart Attack Brother     Breast Cancer Cousin         Also had ovarian cancer--age unknown--maternal     Breast Cancer Cousin         age unknown--maternal    Breast Cancer Cousin         age unknown--maternal     Social History     Tobacco Use    Smoking status: Former Smoker     Packs/day: 0.50     Quit date:      Years since quittin.1    Smokeless tobacco: Never Used   Substance Use Topics    Alcohol use: Not Currently       Specialists/Care Team   Kaykay Cosme has established care with the following healthcare providers:  Patient Care Team:  Karlene Glover NP as PCP - General (Nurse Practitioner)  Karlene Glover NP as PCP - [facility] Empaneled Provider    Health Risk Assessment     Demographics   female  74 y.o. General Health Questions   -During the past 4 weeks:   -how would you rate your health in general? Good   -how often have you been bothered by feeling dizzy when standing up? never   -how much have you been bothered by bodily pain? mildly   -Have you noticed any hearing difficulties? no   -has your physical and emotional health limited your social activities with family or friends? no    Emotional Health Questions   -Do you have a history of depression, anxiety, or emotional problems? yes  -Over the past 2 weeks, have you felt down, depressed or hopeless? yes  -Over the past 2 weeks, have you felt little interest or pleasure in doing things? yes    Health Habits   Please describe your diet habits:   Do you get 5 servings of fruits or vegetables daily? no  Do you exercise regularly? yes    Activities of Daily Living and Functional Status   -Do you need help with eating, walking, dressing, bathing, toileting, the phone, transportation, shopping, preparing meals, housework, laundry, medications or managing money? no  -In the past four weeks, was someone available to help you if you needed and wanted help with anything? yes  -Are you confident are you that you can control and manage most of your health problems? yes  -Have you been given information to help you keep track of your medications? yes  -How often do you have trouble taking your medications as prescribed? never    Fall Risk and Home Safety   Have you fallen 2 or more times in the past year? yes  Does your home have rugs in the hallway, lack grab bars in the bathroom, lack handrails on the stairs or have poor lighting? no  Do you have smoke detectors and check them regularly? yes  Do you have difficulties driving a car? no  Do you always fasten your seat belt when you are in a car? yes    Review of Systems (if indicated for problems addressed today)       Physical Examination     Vitals:    02/24/22 0833   BP: 110/74   Pulse: (!) 58   Resp: 18   Temp: (!) 96.1 °F (35.6 °C)   SpO2: 96%   Weight: 169 lb (76.7 kg)   Height: 5' 5\" (1.651 m)     Body mass index is 28.12 kg/m². No exam data present  Was the patient's timed Up & Go test unsteady or longer than 30 seconds? no    Evaluation of Cognitive Function   Mood/affect:  happy  Orientation: Person, Place, Time and Situation  Appearance: age appropriate  Family member/caregiver input: none    Additional exam if indicated for problems addressed today:  Pt states that her depression is situational, as she is having to care for her ill father. She denies needing any other support at this time, and states that she knows how to manage this. She does believe that the Cymbalta is working well for her. In addition, she continues to cough post COVID. She had covid back in January.   Cough is dry, and reactive. She has had inhalers in the past, but it was years ago    Advice/Referrals/Counseling (as indicated)   Education and counseling provided for any problems identified above:     Preventive Services     (Preventive care checklist to be included in patient instructions)  Discussed today Done Previously Not Needed    x   Pneumococcal vaccines    x  Flu vaccine      Hepatitis B vaccine (if at risk)      Shingles vaccine    x  TDAP vaccine    x  Mammogram      Pap smear   x   Colorectal cancer screening      Low-dose CT for lung cancer screening      Bone density test      Glaucoma screening      Cholesterol test      Diabetes screening test       Diabetes self-management class      Nutritionist referral for diabetes or renal disease     Discussion of Advance Directive   Discussed with Gaylord Goodpasture her ability to prepare and advance directive in the case that an injury or illness causes her to be unable to make health care decisions. Assessment/Plan   Z00.00    Vaccine and VIS given    Soul use inhaler as prescribed. Should coughing continue, should notify office for possible chest x-ray    Pt informed to return to office with worsening of symptoms, or PRN with any questions or concerns. Pt verbalizes understanding of plan of care and denies further questions or concerns at this time. No orders of the defined types were placed in this encounter.           Kirsten Neumann, MAIK

## 2022-02-24 NOTE — PROGRESS NOTES
Identified pt with two pt identifiers(name and ).     Chief Complaint   Patient presents with    Annual Wellness Visit    Chest Congestion        Health Maintenance Due   Topic    Colorectal Cancer Screening Combo     Shingrix Vaccine Age 49> (1 of 2)    Pneumococcal 65+ years (2 of 2 - PPSV23)    Medicare Yearly Exam        Wt Readings from Last 3 Encounters:   22 169 lb (76.7 kg)   22 170 lb (77.1 kg)   21 167 lb (75.8 kg)     Temp Readings from Last 3 Encounters:   22 (!) 96.1 °F (35.6 °C)   22 97.8 °F (36.6 °C) (Temporal)   21 97.6 °F (36.4 °C) (Temporal)     BP Readings from Last 3 Encounters:   22 110/74   22 122/70   21 120/70     Pulse Readings from Last 3 Encounters:   22 (!) 58   22 73   21 62         Learning Assessment:  :     Learning Assessment 2020   PRIMARY LEARNER Patient   HIGHEST LEVEL OF EDUCATION - PRIMARY LEARNER  SOME COLLEGE   BARRIERS PRIMARY LEARNER NONE   CO-LEARNER CAREGIVER No   PRIMARY LANGUAGE ENGLISH   LEARNER PREFERENCE PRIMARY LISTENING     DEMONSTRATION   ANSWERED BY patient   RELATIONSHIP SELF       Depression Screening:  :     3 most recent PHQ Screens 2022   Little interest or pleasure in doing things Nearly every day   Feeling down, depressed, irritable, or hopeless Nearly every day   Total Score PHQ 2 6   Trouble falling or staying asleep, or sleeping too much Not at all   Feeling tired or having little energy Nearly every day   Poor appetite, weight loss, or overeating Not at all   Feeling bad about yourself - or that you are a failure or have let yourself or your family down Not at all   Trouble concentrating on things such as school, work, reading, or watching TV Several days   Moving or speaking so slowly that other people could have noticed; or the opposite being so fidgety that others notice Not at all   Thoughts of being better off dead, or hurting yourself in some way Not at all PHQ 9 Score 10       Fall Risk Assessment:  :     Fall Risk Assessment, last 12 mths 2/24/2022   Able to walk? Yes   Fall in past 12 months? 0   Do you feel unsteady? 0   Number of falls in past 12 months 0       Abuse Screening:  :     Abuse Screening Questionnaire 2/24/2022 1/14/2022 9/22/2021 5/6/2021 11/17/2020   Do you ever feel afraid of your partner? N N N N N   Are you in a relationship with someone who physically or mentally threatens you? N N N N N   Is it safe for you to go home? Y Y Y Y Y       Coordination of Care Questionnaire:  :     1) Have you been to an emergency room, urgent care clinic since your last visit? no   Hospitalized since your last visit? no             2) Have you seen or consulted any other health care providers outside of [address] [place] [address] since your last visit? no  (Include any pap smears or colon screenings in this section.)    3) Do you have an Advance Directive on file? no  Are you interested in receiving information about Advance Directives? no    Patient is accompanied by N/A I have received verbal consent from Al Dominguez to discuss any/all medical information while they are present in the room. Reviewed record in preparation for visit and have obtained necessary documentation.

## 2022-03-02 DIAGNOSIS — R05.9 COUGH: Primary | ICD-10-CM

## 2022-03-02 RX ORDER — PREDNISONE 10 MG/1
TABLET ORAL
Qty: 21 TABLET | Refills: 0 | Status: SHIPPED | OUTPATIENT
Start: 2022-03-02 | End: 2022-09-16

## 2022-03-04 ENCOUNTER — HOSPITAL ENCOUNTER (OUTPATIENT)
Dept: GENERAL RADIOLOGY | Age: 67
Discharge: HOME | End: 2022-03-04
Payer: MEDICARE

## 2022-03-04 ENCOUNTER — TELEPHONE (OUTPATIENT)
Dept: FAMILY MEDICINE CLINIC | Age: 67
End: 2022-03-04

## 2022-03-04 DIAGNOSIS — R05.9 COUGH: ICD-10-CM

## 2022-03-04 PROCEDURE — 71046 X-RAY EXAM CHEST 2 VIEWS: CPT

## 2022-03-04 NOTE — TELEPHONE ENCOUNTER
----- Message from Jaja Hull NP sent at 3/4/2022  2:02 PM EST -----  Please call patient and let her know that her x-ray returned stable. This is reassuring.   Should take prednisone as prescribed, and notify office should cough persist  Thanks

## 2022-03-04 NOTE — PROGRESS NOTES
Please call patient and let her know that her x-ray returned stable. This is reassuring.   Should take prednisone as prescribed, and notify office should cough persist  Thanks

## 2022-03-16 DIAGNOSIS — M19.90 ARTHRITIS: ICD-10-CM

## 2022-03-16 DIAGNOSIS — G47.00 INSOMNIA, UNSPECIFIED TYPE: ICD-10-CM

## 2022-03-16 DIAGNOSIS — I10 ESSENTIAL HYPERTENSION: ICD-10-CM

## 2022-03-16 DIAGNOSIS — F33.1 MODERATE EPISODE OF RECURRENT MAJOR DEPRESSIVE DISORDER (HCC): ICD-10-CM

## 2022-03-16 RX ORDER — ESZOPICLONE 2 MG/1
TABLET, FILM COATED ORAL
Qty: 90 TABLET | Refills: 0 | Status: SHIPPED | OUTPATIENT
Start: 2022-03-16 | End: 2022-06-10

## 2022-03-16 RX ORDER — ATENOLOL 50 MG/1
TABLET ORAL
Qty: 90 TABLET | Refills: 1 | Status: SHIPPED | OUTPATIENT
Start: 2022-03-16 | End: 2022-09-16 | Stop reason: SDUPTHER

## 2022-03-16 RX ORDER — DULOXETIN HYDROCHLORIDE 30 MG/1
CAPSULE, DELAYED RELEASE ORAL
Qty: 90 CAPSULE | Refills: 1 | Status: SHIPPED | OUTPATIENT
Start: 2022-03-16 | End: 2022-09-16 | Stop reason: SDUPTHER

## 2022-03-16 RX ORDER — CELECOXIB 200 MG/1
CAPSULE ORAL
Qty: 90 CAPSULE | Refills: 1 | Status: SHIPPED | OUTPATIENT
Start: 2022-03-16 | End: 2022-09-16

## 2022-03-18 PROBLEM — F33.1 MODERATE EPISODE OF RECURRENT MAJOR DEPRESSIVE DISORDER (HCC): Status: ACTIVE | Noted: 2021-05-11

## 2022-06-09 DIAGNOSIS — G47.00 INSOMNIA, UNSPECIFIED TYPE: ICD-10-CM

## 2022-06-10 RX ORDER — ESZOPICLONE 2 MG/1
TABLET, FILM COATED ORAL
Qty: 90 TABLET | Refills: 0 | Status: SHIPPED | OUTPATIENT
Start: 2022-06-10 | End: 2022-09-16 | Stop reason: SDUPTHER

## 2022-06-20 ENCOUNTER — TRANSCRIBE ORDER (OUTPATIENT)
Dept: SCHEDULING | Age: 67
End: 2022-06-20

## 2022-06-20 DIAGNOSIS — M84.350A STRESS FRACTURE OF PELVIS, INITIAL ENCOUNTER: Primary | ICD-10-CM

## 2022-06-22 ENCOUNTER — HOSPITAL ENCOUNTER (OUTPATIENT)
Age: 67
Setting detail: OUTPATIENT SURGERY
Discharge: HOME | End: 2022-06-22
Attending: INTERNAL MEDICINE | Admitting: INTERNAL MEDICINE
Payer: MEDICARE

## 2022-06-22 ENCOUNTER — TRANSCRIBE ORDER (OUTPATIENT)
Dept: SCHEDULING | Age: 67
End: 2022-06-22

## 2022-06-22 ENCOUNTER — ANESTHESIA (OUTPATIENT)
Dept: ENDOSCOPY | Age: 67
End: 2022-06-22
Payer: MEDICARE

## 2022-06-22 ENCOUNTER — ANESTHESIA EVENT (OUTPATIENT)
Dept: ENDOSCOPY | Age: 67
End: 2022-06-22
Payer: MEDICARE

## 2022-06-22 VITALS
RESPIRATION RATE: 16 BRPM | BODY MASS INDEX: 27.73 KG/M2 | DIASTOLIC BLOOD PRESSURE: 49 MMHG | SYSTOLIC BLOOD PRESSURE: 127 MMHG | OXYGEN SATURATION: 99 % | HEART RATE: 76 BPM | HEIGHT: 65 IN | WEIGHT: 166.45 LBS | TEMPERATURE: 98.3 F

## 2022-06-22 DIAGNOSIS — R10.2 PELVIC PAIN IN FEMALE: ICD-10-CM

## 2022-06-22 DIAGNOSIS — M54.50 LUMBAR SPINE PAIN: ICD-10-CM

## 2022-06-22 DIAGNOSIS — M84.350A STRESS FRACTURE OF PELVIS, INITIAL ENCOUNTER: ICD-10-CM

## 2022-06-22 DIAGNOSIS — M80.059A: Primary | ICD-10-CM

## 2022-06-22 LAB
H PYLORI FROM TISSUE: NEGATIVE
KIT LOT NO., HCLOLOT: NORMAL
NEGATIVE CONTROL: NEGATIVE
POSITIVE CONTROL: POSITIVE

## 2022-06-22 PROCEDURE — 2709999900 HC NON-CHARGEABLE SUPPLY: Performed by: INTERNAL MEDICINE

## 2022-06-22 PROCEDURE — 76040000019: Performed by: INTERNAL MEDICINE

## 2022-06-22 PROCEDURE — 74011250636 HC RX REV CODE- 250/636: Performed by: NURSE ANESTHETIST, CERTIFIED REGISTERED

## 2022-06-22 PROCEDURE — 74011000250 HC RX REV CODE- 250: Performed by: NURSE ANESTHETIST, CERTIFIED REGISTERED

## 2022-06-22 PROCEDURE — 74011250637 HC RX REV CODE- 250/637: Performed by: INTERNAL MEDICINE

## 2022-06-22 PROCEDURE — 88305 TISSUE EXAM BY PATHOLOGIST: CPT

## 2022-06-22 PROCEDURE — 77030021593 HC FCPS BIOP ENDOSC BSC -A: Performed by: INTERNAL MEDICINE

## 2022-06-22 PROCEDURE — C1726 CATH, BAL DIL, NON-VASCULAR: HCPCS | Performed by: INTERNAL MEDICINE

## 2022-06-22 PROCEDURE — 87077 CULTURE AEROBIC IDENTIFY: CPT | Performed by: INTERNAL MEDICINE

## 2022-06-22 PROCEDURE — 76060000031 HC ANESTHESIA FIRST 0.5 HR: Performed by: INTERNAL MEDICINE

## 2022-06-22 RX ORDER — PROPOFOL 10 MG/ML
INJECTION, EMULSION INTRAVENOUS AS NEEDED
Status: DISCONTINUED | OUTPATIENT
Start: 2022-06-22 | End: 2022-06-22 | Stop reason: HOSPADM

## 2022-06-22 RX ORDER — SODIUM CHLORIDE 9 MG/ML
50 INJECTION, SOLUTION INTRAVENOUS CONTINUOUS
Status: DISCONTINUED | OUTPATIENT
Start: 2022-06-22 | End: 2022-06-22 | Stop reason: HOSPADM

## 2022-06-22 RX ORDER — ATROPINE SULFATE 0.1 MG/ML
0.5 INJECTION INTRAVENOUS
Status: DISCONTINUED | OUTPATIENT
Start: 2022-06-22 | End: 2022-06-22 | Stop reason: HOSPADM

## 2022-06-22 RX ORDER — FENTANYL CITRATE 50 UG/ML
INJECTION, SOLUTION INTRAMUSCULAR; INTRAVENOUS AS NEEDED
Status: DISCONTINUED | OUTPATIENT
Start: 2022-06-22 | End: 2022-06-22 | Stop reason: HOSPADM

## 2022-06-22 RX ORDER — LIDOCAINE HYDROCHLORIDE 20 MG/ML
INJECTION, SOLUTION EPIDURAL; INFILTRATION; INTRACAUDAL; PERINEURAL AS NEEDED
Status: DISCONTINUED | OUTPATIENT
Start: 2022-06-22 | End: 2022-06-22 | Stop reason: HOSPADM

## 2022-06-22 RX ORDER — MIDAZOLAM HYDROCHLORIDE 1 MG/ML
INJECTION, SOLUTION INTRAMUSCULAR; INTRAVENOUS AS NEEDED
Status: DISCONTINUED | OUTPATIENT
Start: 2022-06-22 | End: 2022-06-22 | Stop reason: HOSPADM

## 2022-06-22 RX ORDER — SODIUM CHLORIDE 9 MG/ML
INJECTION, SOLUTION INTRAVENOUS
Status: DISCONTINUED | OUTPATIENT
Start: 2022-06-22 | End: 2022-06-22 | Stop reason: HOSPADM

## 2022-06-22 RX ORDER — EPHEDRINE SULFATE/0.9% NACL/PF 50 MG/5 ML
SYRINGE (ML) INTRAVENOUS AS NEEDED
Status: DISCONTINUED | OUTPATIENT
Start: 2022-06-22 | End: 2022-06-22 | Stop reason: HOSPADM

## 2022-06-22 RX ORDER — FLUMAZENIL 0.1 MG/ML
0.2 INJECTION INTRAVENOUS
Status: DISCONTINUED | OUTPATIENT
Start: 2022-06-22 | End: 2022-06-22 | Stop reason: HOSPADM

## 2022-06-22 RX ORDER — MIDAZOLAM HYDROCHLORIDE 1 MG/ML
.25-5 INJECTION, SOLUTION INTRAMUSCULAR; INTRAVENOUS
Status: DISCONTINUED | OUTPATIENT
Start: 2022-06-22 | End: 2022-06-22 | Stop reason: HOSPADM

## 2022-06-22 RX ORDER — DEXTROMETHORPHAN/PSEUDOEPHED 2.5-7.5/.8
1.2 DROPS ORAL
Status: DISCONTINUED | OUTPATIENT
Start: 2022-06-22 | End: 2022-06-22 | Stop reason: HOSPADM

## 2022-06-22 RX ORDER — ONDANSETRON 2 MG/ML
INJECTION INTRAMUSCULAR; INTRAVENOUS AS NEEDED
Status: DISCONTINUED | OUTPATIENT
Start: 2022-06-22 | End: 2022-06-22 | Stop reason: HOSPADM

## 2022-06-22 RX ORDER — PROPOFOL 10 MG/ML
INJECTION, EMULSION INTRAVENOUS
Status: DISCONTINUED | OUTPATIENT
Start: 2022-06-22 | End: 2022-06-22 | Stop reason: HOSPADM

## 2022-06-22 RX ORDER — NALOXONE HYDROCHLORIDE 0.4 MG/ML
0.4 INJECTION, SOLUTION INTRAMUSCULAR; INTRAVENOUS; SUBCUTANEOUS
Status: DISCONTINUED | OUTPATIENT
Start: 2022-06-22 | End: 2022-06-22 | Stop reason: HOSPADM

## 2022-06-22 RX ADMIN — FENTANYL CITRATE 25 MCG: 50 INJECTION, SOLUTION INTRAMUSCULAR; INTRAVENOUS at 08:17

## 2022-06-22 RX ADMIN — FENTANYL CITRATE 25 MCG: 50 INJECTION, SOLUTION INTRAMUSCULAR; INTRAVENOUS at 08:05

## 2022-06-22 RX ADMIN — Medication 10 MG: at 08:25

## 2022-06-22 RX ADMIN — Medication 10 MG: at 08:20

## 2022-06-22 RX ADMIN — PROPOFOL 50 MG: 10 INJECTION, EMULSION INTRAVENOUS at 08:02

## 2022-06-22 RX ADMIN — PROPOFOL 300 MCG/KG/MIN: 10 INJECTION, EMULSION INTRAVENOUS at 08:02

## 2022-06-22 RX ADMIN — Medication 20 MG: at 08:23

## 2022-06-22 RX ADMIN — Medication 10 MG: at 08:16

## 2022-06-22 RX ADMIN — LIDOCAINE HYDROCHLORIDE 80 MG: 20 INJECTION, SOLUTION EPIDURAL; INFILTRATION; INTRACAUDAL; PERINEURAL at 08:02

## 2022-06-22 RX ADMIN — SODIUM CHLORIDE: 9 INJECTION, SOLUTION INTRAVENOUS at 07:58

## 2022-06-22 RX ADMIN — PHENYLEPHRINE HYDROCHLORIDE 100 MCG: 10 INJECTION INTRAVENOUS at 08:25

## 2022-06-22 RX ADMIN — FENTANYL CITRATE 50 MCG: 50 INJECTION, SOLUTION INTRAMUSCULAR; INTRAVENOUS at 08:01

## 2022-06-22 RX ADMIN — ONDANSETRON HYDROCHLORIDE 4 MG: 2 SOLUTION INTRAMUSCULAR; INTRAVENOUS at 08:22

## 2022-06-22 RX ADMIN — MIDAZOLAM 2 MG: 1 INJECTION, SOLUTION INTRAMUSCULAR; INTRAVENOUS at 08:01

## 2022-06-22 NOTE — PROCEDURES
[address]  [address] [facility], [address] [place] [address]       [phone]                   2022                EGD Operative Report  Natalie Quan  :  1955  [facility] Medical Record Number:  [id number]      Indication:  Abdominal pain, RUQ     : Hannah Us MD    Assistants: None    Referring Provider:  Tita Arora NP      Anesthesia/Sedation:  MAC anesthesia Propofol    Airway assessment: No airway problems anticipated    Pre-Procedural Exam:      Airway: clear, no airway problems anticipated  Heart: RRR, without gallops or rubs  Lungs: clear bilaterally without wheezes, crackles, or rhonchi  Abdomen: soft, nontender, nondistended, bowel sounds present  Mental Status: awake, alert and oriented to person, place and time       Procedure Details     After infomed consent was obtained for the procedure, with all risks and benefits of procedure explained the patient was taken to the endoscopy suite and placed in the left lateral decubitus position. Following sequential administration of sedation as per above, the endoscope was inserted into the mouth and advanced under direct vision to second portion of the duodenum. A careful inspection was made as the gastroscope was withdrawn, including a retroflexed view of the proximal stomach; findings and interventions are described below. Findings:   Esophagus:normal  Stomach:Bening intrinsic pyloric stenosis seen. Scope could not be traversed initially. Successful TTS pyloric balloon dilation performed 12 mm and scope advanced. 2 ulcer(s) 18 mm in size located in   antrum, pre pyloric antrum Ulcers were clean based w/o active bleeding. BX taken for histology.  Friable mucosa with mild bleeding to air insuflation noted                     Otherwise stomach is normal                       Duodenum/jejunum: normal    Therapies:  esophageal dilation with 12 mm sized balloon    Specimens: Stomach ulcer    Implants: none           Complications:   None; patient tolerated the procedure well. EBL:  None. Impression:     gastric ulcer  pyloric stenosis ( dilated)         Recommendations:    -Acid suppression with a proton pump inhibitor. ,   -Await pathology. ,   -Await ARIA test result and treat for Helicobacter pylori if positive. ,   -No NSAIDS ( celebrex)   -Repeat EGD in 2 months  -Start Sucralfate 10 ml BID for 21 days  -Colonoscopy today    Amparo Lemon MD

## 2022-06-22 NOTE — PERIOP NOTES
Received Report From Lorenzo Mcburney @ 2572, see anesthesia notes. Care of the patient transferred to procedure nurse Shelva Dancer, RN @ 0513    Out of Procedure and sent to post-recovery @ 7118    Post-recovery report given to Jazmin Mathias RN @ 2466    Patient ABD remains soft and non-tender post procedure. Pt has no complaints at this time and tolerated the procedure well. Endoscope was pre-cleaned at bedside immediately following procedure by Nora Patrick. CRE balloon dilatation of the esophagus   10 mm Balloon inflated to 3 ATMs and held for 30 seconds. 11 mm Balloon inflated to 5 ATMs and held for 30 seconds. No subcutaneous crepitus of the chest or cervical region was noted post dilatation.

## 2022-06-22 NOTE — PROGRESS NOTES
Endoscopy discharge instructions have been reviewed and given to patient. The patient verbalized understanding and acceptance of instructions. Dr. Samantha Dupont discussed with patient procedure findings and next steps.

## 2022-06-22 NOTE — ANESTHESIA PREPROCEDURE EVALUATION
Relevant Problems   No relevant active problems       Anesthetic History     PONV          Review of Systems / Medical History  Patient summary reviewed and nursing notes reviewed    Pulmonary          Shortness of breath      Comments: Due to COVID 1/2022   Neuro/Psych         Psychiatric history    Comments: Anxiety/depression  Chronic insomnia Cardiovascular    Hypertension: well controlled  Valvular problems/murmurs                 GI/Hepatic/Renal               Comments: Gastroparesis Endo/Other        Arthritis     Other Findings              Physical Exam    Airway  Mallampati: II    Neck ROM: normal range of motion   Mouth opening: Normal     Cardiovascular    Rhythm: regular  Rate: normal         Dental         Pulmonary  Breath sounds clear to auscultation               Abdominal         Other Findings            Anesthetic Plan    ASA: 2  Anesthesia type: MAC            Anesthetic plan and risks discussed with: Patient      Informed consent obtained.

## 2022-06-22 NOTE — DISCHARGE INSTRUCTIONS
[facility]    [phone]      Colonoscopy and Egd Discharge Instructions    2022    Lupe Valverde  :  1955  [facility] Medical Record Number:  [id number]      Discomfort:  Sore throat- throat lozenges or warm salt water gargle  Redness at IV site- apply warm compress to area; if redness or soreness persist- contact your physician  There may be a slight amount of blood passed from the rectum  Gaseous discomfort- walking, belching will help relieve any discomfort  You may not operate a vehicle for 12 hours  You may not engage in an occupation involving machinery or appliances for rest of today  You may not drink alcoholic beverages for at least 12 hours  Avoid making any critical decisions for at least 24 hour  DIET:   {Gi dietary recommendations:}   - however -  remember your colon is empty and a heavy meal will produce gas. Avoid these foods:  vegetables, fried / greasy foods, carbonated drinks for today     ACTIVITY:  You may  resume your normal daily activities it is recommended that you spend the remainder of the day resting -  avoid any strenuous activity and driving. CALL M.D. ANY SIGN OF:   Increasing pain, nausea, vomiting  Abdominal distension (swelling)  New increased bleeding (oral or rectal)  Fever (chills)  Pain in chest area  Bloody discharge from nose or mouth  Shortness of breath        Vasylmon Valverde  [id number]  1955      Follow-up Instructions:   Call Dr. Germán Prajapati if any questions at [phone]. Results of procedure / biopsy in 7 to 10 days, we will call you with these results. Your endoscopy showed stomach ulcers, narrowed stomach channel ( pylorus) - streched.  All this findings are from NSAID use- celebrex- please discuss with your prescribing provider other alternatives   Your colonoscopy showed diverticulosis      DISCHARGE SUMMARY from Nurse    The following personal items collected during your admission are returned to you:   Dental Appliance: Dental Appliances: None  Vision: Visual Aid: None  Hearing Aid:    Jewelry:    Clothing:    Other Valuables:    Valuables sent to safe:      PATIENT INSTRUCTIONS:    Take Home Medications:  Pantoprazole 40 mg daily 3 months  Sucralfate 10 ml 2-3 times daily 3 weeks  Discontinue celebrex

## 2022-06-22 NOTE — PROCEDURES
[address] [facility] [address]  [address] [facility], [address] [facility]  [phone]                   Colonoscopy Operative Report      Indications:    Screening colonoscopy     :  Dedrick Beth MD    Assistants: None    Referring Provider: Lita Craft NP    Sedation:  MAC anesthesia Propofol    Procedure Details:  After informed consent was obtained with all risks and benefits of procedure explained and preoperative exam completed, the patient was taken to the endoscopy suite and placed in the left lateral decubitus position. Upon sequential sedation as per above, a digital rectal exam was performed  And was normal.  The Olympus videocolonoscope  was inserted in the rectum and carefully advanced to the cecum, which was identified by the ileocecal valve and appendiceal orifice, terminal ileum. The quality of preparation was excellent. The colonoscope was slowly withdrawn with careful evaluation between folds. Retroflexion in the rectum was performed and was normal..     Findings:   Rectum: normal  Sigmoid:     - Diverticulosis  Descending Colon:     - Diverticulosis  Transverse Colon: normal  Ascending Colon: normal  Cecum: normal  Terminal Ileum: normal    Interventions:  none    Specimen Removed:  none    Implants: none    Complications: None. EBL:  None. Recommendations:   -For colon cancer screening in this average-risk patient, colonoscopy may be repeated in 10 years.  -High fiber diet.     -Resume normal medication(s)  -[place] office for any questions/concerns       Discharge Disposition:  Home in the company of a  when able to ambulate.     Dedrick Beth MD  6/22/2022  8:39 AM

## 2022-06-22 NOTE — ANESTHESIA POSTPROCEDURE EVALUATION
Procedure(s):  COLONOSCOPY  ESOPHAGOGASTRODUODENOSCOPY (EGD)  ESOPHAGOGASTRODUODENAL (EGD) BIOPSY  ESOPHAGEAL DILATION. MAC    Anesthesia Post Evaluation      Multimodal analgesia: multimodal analgesia not used between 6 hours prior to anesthesia start to PACU discharge  Patient location during evaluation: PACU  Patient participation: complete - patient participated  Level of consciousness: awake and alert  Pain score: 0  Pain management: adequate  Airway patency: patent  Anesthetic complications: no  Cardiovascular status: hemodynamically stable and acceptable  Respiratory status: acceptable  Hydration status: acceptable  Comments: Patient seen and evaluated; no concerns. Post anesthesia nausea and vomiting:  none      INITIAL Post-op Vital signs:   Vitals Value Taken Time   /49 06/22/22 0855   Temp 36.8 °C (98.3 °F) 06/22/22 0840   Pulse 74 06/22/22 0859   Resp 17 06/22/22 0859   SpO2 100 % 06/22/22 0859   Vitals shown include unvalidated device data. Oriented - self; Oriented - place; Oriented - time

## 2022-06-30 ENCOUNTER — HOSPITAL ENCOUNTER (OUTPATIENT)
Dept: CT IMAGING | Age: 67
Discharge: HOME | End: 2022-06-30
Attending: STUDENT IN AN ORGANIZED HEALTH CARE EDUCATION/TRAINING PROGRAM
Payer: MEDICARE

## 2022-06-30 DIAGNOSIS — M80.059A: ICD-10-CM

## 2022-06-30 DIAGNOSIS — R10.2 PELVIC PAIN IN FEMALE: ICD-10-CM

## 2022-06-30 DIAGNOSIS — M54.50 LUMBAR SPINE PAIN: ICD-10-CM

## 2022-06-30 DIAGNOSIS — M84.350A STRESS FRACTURE OF PELVIS, INITIAL ENCOUNTER: ICD-10-CM

## 2022-06-30 PROCEDURE — 74176 CT ABD & PELVIS W/O CONTRAST: CPT

## 2022-09-16 ENCOUNTER — OFFICE VISIT (OUTPATIENT)
Dept: FAMILY MEDICINE CLINIC | Age: 67
End: 2022-09-16
Payer: MEDICARE

## 2022-09-16 VITALS
OXYGEN SATURATION: 97 % | SYSTOLIC BLOOD PRESSURE: 124 MMHG | BODY MASS INDEX: 28.82 KG/M2 | WEIGHT: 173 LBS | RESPIRATION RATE: 18 BRPM | TEMPERATURE: 97.4 F | HEART RATE: 64 BPM | DIASTOLIC BLOOD PRESSURE: 78 MMHG | HEIGHT: 65 IN

## 2022-09-16 DIAGNOSIS — I10 ESSENTIAL HYPERTENSION: Primary | ICD-10-CM

## 2022-09-16 DIAGNOSIS — R79.9 ABNORMAL FINDING OF BLOOD CHEMISTRY, UNSPECIFIED: ICD-10-CM

## 2022-09-16 DIAGNOSIS — M19.90 ARTHRITIS: ICD-10-CM

## 2022-09-16 DIAGNOSIS — F33.1 MODERATE EPISODE OF RECURRENT MAJOR DEPRESSIVE DISORDER (HCC): ICD-10-CM

## 2022-09-16 DIAGNOSIS — M81.0 AGE-RELATED OSTEOPOROSIS WITHOUT CURRENT PATHOLOGICAL FRACTURE: ICD-10-CM

## 2022-09-16 DIAGNOSIS — G47.00 INSOMNIA, UNSPECIFIED TYPE: ICD-10-CM

## 2022-09-16 PROCEDURE — 1123F ACP DISCUSS/DSCN MKR DOCD: CPT | Performed by: FAMILY MEDICINE

## 2022-09-16 PROCEDURE — 99204 OFFICE O/P NEW MOD 45 MIN: CPT | Performed by: FAMILY MEDICINE

## 2022-09-16 RX ORDER — ESZOPICLONE 2 MG/1
2 TABLET, FILM COATED ORAL
Qty: 90 TABLET | Refills: 0 | Status: SHIPPED | OUTPATIENT
Start: 2022-09-16

## 2022-09-16 RX ORDER — DULOXETIN HYDROCHLORIDE 30 MG/1
30 CAPSULE, DELAYED RELEASE ORAL DAILY
Qty: 90 CAPSULE | Refills: 1 | Status: SHIPPED | OUTPATIENT
Start: 2022-09-16

## 2022-09-16 RX ORDER — ATENOLOL 50 MG/1
50 TABLET ORAL DAILY
Qty: 90 TABLET | Refills: 1 | Status: SHIPPED | OUTPATIENT
Start: 2022-09-16

## 2022-09-16 NOTE — PROGRESS NOTES
Identified pt with two pt identifiers(name and ). Chief Complaint   Patient presents with    Hypertension    Depression        Health Maintenance Due   Topic    Shingrix Vaccine Age 50> (1 of 2)    COVID-19 Vaccine (4 - Booster for Moderna series)    Flu Vaccine (1)    Lipid Screen        Wt Readings from Last 3 Encounters:   22 173 lb (78.5 kg)   22 166 lb 7.2 oz (75.5 kg)   22 169 lb (76.7 kg)     Temp Readings from Last 3 Encounters:   22 97.4 °F (36.3 °C) (Temporal)   22 98.3 °F (36.8 °C)   22 (!) 96.1 °F (35.6 °C)     BP Readings from Last 3 Encounters:   22 124/78   22 (!) 127/49   22 110/74     Pulse Readings from Last 3 Encounters:   22 64   22 76   22 (!) 58         Learning Assessment:  :     Learning Assessment 2020   PRIMARY LEARNER Patient   HIGHEST LEVEL OF EDUCATION - PRIMARY LEARNER  SOME COLLEGE   BARRIERS PRIMARY LEARNER NONE   CO-LEARNER CAREGIVER No   PRIMARY LANGUAGE ENGLISH   LEARNER PREFERENCE PRIMARY LISTENING     DEMONSTRATION   ANSWERED BY patient   RELATIONSHIP SELF       Depression Screening:  :     3 most recent PHQ Screens 2022   Little interest or pleasure in doing things Not at all   Feeling down, depressed, irritable, or hopeless Not at all   Total Score PHQ 2 0   Trouble falling or staying asleep, or sleeping too much -   Feeling tired or having little energy -   Poor appetite, weight loss, or overeating -   Feeling bad about yourself - or that you are a failure or have let yourself or your family down -   Trouble concentrating on things such as school, work, reading, or watching TV -   Moving or speaking so slowly that other people could have noticed; or the opposite being so fidgety that others notice -   Thoughts of being better off dead, or hurting yourself in some way -   PHQ 9 Score -       Fall Risk Assessment:  :     Fall Risk Assessment, last 12 mths 2022   Able to walk?  Yes   Fall in past 12 months? 0   Do you feel unsteady? 0   Number of falls in past 12 months 0       Abuse Screening:  :     Abuse Screening Questionnaire 9/16/2022 2/24/2022 1/14/2022 9/22/2021 5/6/2021 11/17/2020   Do you ever feel afraid of your partner? N N N N N N   Are you in a relationship with someone who physically or mentally threatens you? N N N N N N   Is it safe for you to go home? Y Y Y Y Y Y       Coordination of Care Questionnaire:  :     1) Have you been to an emergency room, urgent care clinic since your last visit? no   Hospitalized since your last visit? no             2) Have you seen or consulted any other health care providers outside of [address] [place] [address] since your last visit? no  (Include any pap smears or colon screenings in this section.)    3) Do you have an Advance Directive on file? no  Are you interested in receiving information about Advance Directives? no    4. For patients aged 39-70: Has the patient had a colonoscopy / FIT/ Cologuard? Yes - no Care Gap present      If the patient is female:    5. For patients aged 41-77: Has the patient had a mammogram within the past 2 years? Yes - no Care Gap present      6. For patients aged 21-65: Has the patient had a pap smear?  NA - based on age or sex

## 2022-09-18 NOTE — PROGRESS NOTES
Charles Bassett (: 1955) is a 74 y.o. female, established patient, here for evaluation of the following chief complaint(s):  Hypertension and Depression       ASSESSMENT/PLAN:  Below is the assessment and plan developed based on review of pertinent history, physical exam, labs, studies, and medications. 1. Essential hypertension  -     atenoloL (TENORMIN) 50 mg tablet; Take 1 Tablet by mouth daily. , Normal, Disp-90 Tablet, R-1  2. Arthritis  -     DULoxetine (CYMBALTA) 30 mg capsule; Take 1 Capsule by mouth daily. , Normal, Disp-90 Capsule, R-1  3. Moderate episode of recurrent major depressive disorder (HCC)  -     DULoxetine (CYMBALTA) 30 mg capsule; Take 1 Capsule by mouth daily. , Normal, Disp-90 Capsule, R-1  4. Insomnia, unspecified type  -     eszopiclone (LUNESTA) 2 mg tablet; Take 1 Tablet by mouth nightly., Normal, Disp-90 Tablet, R-0  -     CBC WITH AUTOMATED DIFF; Future  -     METABOLIC PANEL, COMPREHENSIVE; Future  -     LIPID PANEL; Future  5. Abnormal finding of blood chemistry, unspecified   -     LIPID PANEL; Future  6. Age-related osteoporosis without current pathological fracture      No follow-ups on file. SUBJECTIVE/OBJECTIVE:  Charles Bassett is a 74 y.o. female with osteoporosis, H risk for falling presents to the clinic for medication refill. Patient is not amenable to starting medications for osteoporosis, and all risk factors including unstable ankles increasing her risk for fall and increases her chances for debilitating hip fracture have been discussed. Review of Systems   All other systems reviewed and are negative. Physical Exam  Constitutional:       Appearance: Normal appearance. HENT:      Head: Atraumatic. Right Ear: External ear normal.      Left Ear: External ear normal.   Cardiovascular:      Rate and Rhythm: Normal rate. Pulmonary:      Effort: Pulmonary effort is normal.   Abdominal:      General: Abdomen is flat.    Musculoskeletal: General: Deformity and signs of injury present. Neurological:      Mental Status: She is alert. Psychiatric:         Mood and Affect: Mood normal.         Behavior: Behavior normal.         Thought Content: Thought content normal.         Judgment: Judgment normal.           An electronic signature was used to authenticate this note.   -- Liliana Beaver MD

## 2022-09-19 ENCOUNTER — TELEPHONE (OUTPATIENT)
Dept: FAMILY MEDICINE CLINIC | Age: 67
End: 2022-09-19

## 2022-09-23 ENCOUNTER — LAB ONLY (OUTPATIENT)
Dept: FAMILY MEDICINE CLINIC | Age: 67
End: 2022-09-23

## 2022-09-23 DIAGNOSIS — R79.9 ABNORMAL FINDING OF BLOOD CHEMISTRY, UNSPECIFIED: ICD-10-CM

## 2022-09-23 DIAGNOSIS — G47.00 INSOMNIA, UNSPECIFIED TYPE: ICD-10-CM

## 2022-09-24 LAB
ALBUMIN SERPL-MCNC: 3.8 G/DL (ref 3.5–5)
ALBUMIN/GLOB SERPL: 1.3 {RATIO} (ref 1.1–2.2)
ALP SERPL-CCNC: 72 U/L (ref 45–117)
ALT SERPL-CCNC: 20 U/L (ref 12–78)
ANION GAP SERPL CALC-SCNC: 2 MMOL/L (ref 5–15)
AST SERPL-CCNC: 19 U/L (ref 15–37)
BASOPHILS # BLD: 0 K/UL (ref 0–0.1)
BASOPHILS NFR BLD: 1 % (ref 0–1)
BILIRUB SERPL-MCNC: 0.4 MG/DL (ref 0.2–1)
BUN SERPL-MCNC: 19 MG/DL (ref 6–20)
BUN/CREAT SERPL: 19 (ref 12–20)
CALCIUM SERPL-MCNC: 9.2 MG/DL (ref 8.5–10.1)
CHLORIDE SERPL-SCNC: 110 MMOL/L (ref 97–108)
CHOLEST SERPL-MCNC: 238 MG/DL
CO2 SERPL-SCNC: 30 MMOL/L (ref 21–32)
CREAT SERPL-MCNC: 1 MG/DL (ref 0.55–1.02)
DIFFERENTIAL METHOD BLD: NORMAL
EOSINOPHIL # BLD: 0.1 K/UL (ref 0–0.4)
EOSINOPHIL NFR BLD: 2 % (ref 0–7)
ERYTHROCYTE [DISTWIDTH] IN BLOOD BY AUTOMATED COUNT: 12.4 % (ref 11.5–14.5)
GLOBULIN SER CALC-MCNC: 2.9 G/DL (ref 2–4)
GLUCOSE SERPL-MCNC: 126 MG/DL (ref 65–100)
HCT VFR BLD AUTO: 41.4 % (ref 35–47)
HDLC SERPL-MCNC: 67 MG/DL
HDLC SERPL: 3.6 {RATIO} (ref 0–5)
HGB BLD-MCNC: 13.8 G/DL (ref 11.5–16)
IMM GRANULOCYTES # BLD AUTO: 0 K/UL (ref 0–0.04)
IMM GRANULOCYTES NFR BLD AUTO: 0 % (ref 0–0.5)
LDLC SERPL CALC-MCNC: 142.8 MG/DL (ref 0–100)
LYMPHOCYTES # BLD: 1.3 K/UL (ref 0.8–3.5)
LYMPHOCYTES NFR BLD: 26 % (ref 12–49)
MCH RBC QN AUTO: 31 PG (ref 26–34)
MCHC RBC AUTO-ENTMCNC: 33.3 G/DL (ref 30–36.5)
MCV RBC AUTO: 93 FL (ref 80–99)
MONOCYTES # BLD: 0.4 K/UL (ref 0–1)
MONOCYTES NFR BLD: 9 % (ref 5–13)
NEUTS SEG # BLD: 3.2 K/UL (ref 1.8–8)
NEUTS SEG NFR BLD: 62 % (ref 32–75)
NRBC # BLD: 0 K/UL (ref 0–0.01)
NRBC BLD-RTO: 0 PER 100 WBC
PLATELET # BLD AUTO: 182 K/UL (ref 150–400)
PMV BLD AUTO: 11.3 FL (ref 8.9–12.9)
POTASSIUM SERPL-SCNC: 4.8 MMOL/L (ref 3.5–5.1)
PROT SERPL-MCNC: 6.7 G/DL (ref 6.4–8.2)
RBC # BLD AUTO: 4.45 M/UL (ref 3.8–5.2)
SODIUM SERPL-SCNC: 142 MMOL/L (ref 136–145)
TRIGL SERPL-MCNC: 141 MG/DL (ref ?–150)
VLDLC SERPL CALC-MCNC: 28.2 MG/DL
WBC # BLD AUTO: 5.1 K/UL (ref 3.6–11)

## 2022-09-26 NOTE — PROGRESS NOTES
Patients total cholesterol and LDL are elevated, this indicates that the patient has an increased cardiovascular risk. Should start exercising at least 30 minutes a day, increase water intake to 64 ounces, take a fiber supplement. Increase amount of fresh fruits and vegetables in the diet. Take a fish oil supplement. Advise trying the mediterranean diet.

## 2022-12-14 DIAGNOSIS — G47.00 INSOMNIA, UNSPECIFIED TYPE: ICD-10-CM

## 2022-12-14 RX ORDER — ESZOPICLONE 2 MG/1
2 TABLET, FILM COATED ORAL
Qty: 90 TABLET | Refills: 0 | Status: SHIPPED | OUTPATIENT
Start: 2022-12-14

## 2023-02-06 ENCOUNTER — TRANSCRIBE ORDER (OUTPATIENT)
Dept: SCHEDULING | Age: 68
End: 2023-02-06

## 2023-02-06 DIAGNOSIS — Z12.31 SCREENING MAMMOGRAM FOR HIGH-RISK PATIENT: Primary | ICD-10-CM

## 2023-02-13 ENCOUNTER — OFFICE VISIT (OUTPATIENT)
Dept: FAMILY MEDICINE CLINIC | Age: 68
End: 2023-02-13
Payer: MEDICARE

## 2023-02-13 VITALS
SYSTOLIC BLOOD PRESSURE: 122 MMHG | BODY MASS INDEX: 28.36 KG/M2 | OXYGEN SATURATION: 94 % | HEIGHT: 65 IN | TEMPERATURE: 97.8 F | RESPIRATION RATE: 18 BRPM | HEART RATE: 66 BPM | WEIGHT: 170.2 LBS | DIASTOLIC BLOOD PRESSURE: 74 MMHG

## 2023-02-13 DIAGNOSIS — N63.21 MASS OF UPPER OUTER QUADRANT OF LEFT BREAST: Primary | ICD-10-CM

## 2023-02-13 DIAGNOSIS — F33.1 MODERATE EPISODE OF RECURRENT MAJOR DEPRESSIVE DISORDER (HCC): ICD-10-CM

## 2023-02-13 NOTE — PROGRESS NOTES
Chief Complaint   Patient presents with    Breast pain     Left pain in left side of  breast about 2/3 weeks ago after waking up patient does not recall any injuries patient states pain has went away but patient would like to discuss        Assessment/ Plan:   Diagnoses and all orders for this visit:    1. Mass of upper outer quadrant of left breast  -     DYLAN MAMMO LT DX INCL CAD; Future  -     US BREAST LT COMPLETE 4 QUAD; Future    2. Moderate episode of recurrent major depressive disorder [facility])  Assessment & Plan:   well controlled, continue current medications, continue current treatment plan    I have discussed the diagnosis with the patient and the intended treatment plan as seen in the above orders. The patient has received an after-visit summary and questions were answered concerning future plans. Asked to return should symptoms worsen or not improve with treatment. Any pending labs and studies will be relayed to patient when they become available. Pt verbalizes understanding of plan of care and denies further questions or concerns at this time. Follow-up and Dispositions    Return if symptoms worsen or fail to improve. Subjective:   Maria Victoria Munguia is a 69 y.o. female who presents with left sided breast pain and swelling along the lateral border of the L-upper breast. She denies any trauma. It just seemed to happen spontaneously. The pain is reproducible when she presses on the area. She denies any other symptoms. No rash. No fever or chills. In general, has been doing well. HISTORICAL  PMH, PSH, FHX, SOCHX, ALLERGIES and MES were reviewed and updated today. Review of Systems  Review of Systems   Constitutional: Negative. HENT: Negative. Eyes: Negative. Respiratory: Negative. Cardiovascular: Negative. Gastrointestinal: Negative. Genitourinary: Negative. Musculoskeletal:  Positive for myalgias (tenderness upper L-breast/chest). Skin: Negative.     Neurological: Negative. Endo/Heme/Allergies: Negative. Psychiatric/Behavioral: Negative. All other systems reviewed and are negative. Objective:     Vitals:    02/13/23 0951   BP: 122/74   Pulse: 66   Resp: 18   Temp: 97.8 °F (36.6 °C)   TempSrc: Temporal   SpO2: 94%   Weight: 170 lb 3.2 oz (77.2 kg)   Height: 5' 5\" (1.651 m)       Physical Exam  Cardiovascular:      Rate and Rhythm: Normal rate and regular rhythm. Pulses: Normal pulses. Heart sounds: Normal heart sounds. Pulmonary:      Effort: Pulmonary effort is normal.      Breath sounds: Normal breath sounds. Chest:       Musculoskeletal:         General: Normal range of motion. Skin:     General: Skin is warm. Capillary Refill: Capillary refill takes less than 2 seconds. Neurological:      General: No focal deficit present. Mental Status: Mental status is at baseline. Psychiatric:         Mood and Affect: Mood normal.         Behavior: Behavior normal.         Thought Content:  Thought content normal.         Judgment: Judgment normal.     Dante Melissa MD  [facility]   02/13/2023

## 2023-02-13 NOTE — PROGRESS NOTES
Identified pt with two pt identifiers(name and ).     Chief Complaint   Patient presents with    Breast pain     Left pain in left side of  breast about 2/3 weeks ago after waking up patient does not recall any injuries patient states pain has went away but patient would like to discuss         Health Maintenance Due   Topic    Shingles Vaccine (1 of 2)    COVID-19 Vaccine (4 - Booster for Moderna series)    Flu Vaccine (1)    Medicare Yearly Exam        Wt Readings from Last 3 Encounters:   22 173 lb (78.5 kg)   22 166 lb 7.2 oz (75.5 kg)   22 169 lb (76.7 kg)     Temp Readings from Last 3 Encounters:   22 97.4 °F (36.3 °C) (Temporal)   22 98.3 °F (36.8 °C)   22 (!) 96.1 °F (35.6 °C)     BP Readings from Last 3 Encounters:   22 124/78   22 (!) 127/49   22 110/74     Pulse Readings from Last 3 Encounters:   22 64   22 76   22 (!) 58         Learning Assessment:  :     Learning Assessment 2020   PRIMARY LEARNER Patient   HIGHEST LEVEL OF EDUCATION - PRIMARY LEARNER  SOME COLLEGE   BARRIERS PRIMARY LEARNER NONE   CO-LEARNER CAREGIVER No   PRIMARY LANGUAGE ENGLISH   LEARNER PREFERENCE PRIMARY LISTENING     DEMONSTRATION   ANSWERED BY patient   RELATIONSHIP SELF       Depression Screening:  :     3 most recent PHQ Screens 2023   Little interest or pleasure in doing things Not at all   Feeling down, depressed, irritable, or hopeless Not at all   Total Score PHQ 2 0   Trouble falling or staying asleep, or sleeping too much Not at all   Feeling tired or having little energy Not at all   Poor appetite, weight loss, or overeating Not at all   Feeling bad about yourself - or that you are a failure or have let yourself or your family down Not at all   Trouble concentrating on things such as school, work, reading, or watching TV Not at all   Moving or speaking so slowly that other people could have noticed; or the opposite being so fidgety that others notice Not at all   Thoughts of being better off dead, or hurting yourself in some way Not at all   PHQ 9 Score 0       Fall Risk Assessment:  :     Fall Risk Assessment, last 12 mths 2/24/2022   Able to walk? Yes   Fall in past 12 months? 0   Do you feel unsteady? 0   Number of falls in past 12 months 0       Abuse Screening:  :     Abuse Screening Questionnaire 9/16/2022 2/24/2022 1/14/2022 9/22/2021 5/6/2021 11/17/2020   Do you ever feel afraid of your partner? N N N N N N   Are you in a relationship with someone who physically or mentally threatens you? N N N N N N   Is it safe for you to go home? Y Y Y Y Y Y       Coordination of Care Questionnaire:  :     1) Have you been to an emergency room, urgent care clinic since your last visit? no   Hospitalized since your last visit? no             2) Have you seen or consulted any other health care providers outside of [address] [place] [address] since your last visit? no  (Include any pap smears or colon screenings in this section.)    3) Do you have an Advance Directive on file? no  Are you interested in receiving information about Advance Directives? no    Patient is accompanied by self I have received verbal consent from Wojciech Berg to discuss any/all medical information while they are present in the room. 4.  For patients aged 39-70: Has the patient had a colonoscopy / FIT/ Cologuard? Yes - no Care Gap present      If the patient is female:    5. For patients aged 41-77: Has the patient had a mammogram within the past 2 years? Yes - no Care Gap present      6. For patients aged 21-65: Has the patient had a pap smear?  NA - based on age or sex

## 2023-03-01 ENCOUNTER — ANESTHESIA (OUTPATIENT)
Dept: ENDOSCOPY | Age: 68
End: 2023-03-01
Payer: MEDICARE

## 2023-03-01 ENCOUNTER — HOSPITAL ENCOUNTER (OUTPATIENT)
Age: 68
Setting detail: OUTPATIENT SURGERY
Discharge: HOME | End: 2023-03-01
Attending: INTERNAL MEDICINE | Admitting: INTERNAL MEDICINE
Payer: MEDICARE

## 2023-03-01 ENCOUNTER — ANESTHESIA EVENT (OUTPATIENT)
Dept: ENDOSCOPY | Age: 68
End: 2023-03-01
Payer: MEDICARE

## 2023-03-01 VITALS
RESPIRATION RATE: 18 BRPM | SYSTOLIC BLOOD PRESSURE: 127 MMHG | DIASTOLIC BLOOD PRESSURE: 66 MMHG | HEIGHT: 65 IN | OXYGEN SATURATION: 100 % | HEART RATE: 56 BPM | TEMPERATURE: 97.7 F | WEIGHT: 172.18 LBS | BODY MASS INDEX: 28.69 KG/M2

## 2023-03-01 PROCEDURE — 88305 TISSUE EXAM BY PATHOLOGIST: CPT

## 2023-03-01 PROCEDURE — 74011250636 HC RX REV CODE- 250/636: Performed by: INTERNAL MEDICINE

## 2023-03-01 PROCEDURE — 74011250636 HC RX REV CODE- 250/636: Performed by: NURSE ANESTHETIST, CERTIFIED REGISTERED

## 2023-03-01 PROCEDURE — 2709999900 HC NON-CHARGEABLE SUPPLY: Performed by: INTERNAL MEDICINE

## 2023-03-01 PROCEDURE — 76040000019: Performed by: INTERNAL MEDICINE

## 2023-03-01 PROCEDURE — 77030021593 HC FCPS BIOP ENDOSC BSC -A: Performed by: INTERNAL MEDICINE

## 2023-03-01 PROCEDURE — C1726 CATH, BAL DIL, NON-VASCULAR: HCPCS | Performed by: INTERNAL MEDICINE

## 2023-03-01 PROCEDURE — 76060000031 HC ANESTHESIA FIRST 0.5 HR: Performed by: INTERNAL MEDICINE

## 2023-03-01 PROCEDURE — 74011000250 HC RX REV CODE- 250: Performed by: NURSE ANESTHETIST, CERTIFIED REGISTERED

## 2023-03-01 RX ORDER — SODIUM CHLORIDE 9 MG/ML
50 INJECTION, SOLUTION INTRAVENOUS CONTINUOUS
Status: DISCONTINUED | OUTPATIENT
Start: 2023-03-01 | End: 2023-03-01 | Stop reason: HOSPADM

## 2023-03-01 RX ORDER — FLUMAZENIL 0.1 MG/ML
0.2 INJECTION INTRAVENOUS
Status: DISCONTINUED | OUTPATIENT
Start: 2023-03-01 | End: 2023-03-01 | Stop reason: HOSPADM

## 2023-03-01 RX ORDER — DEXMEDETOMIDINE HYDROCHLORIDE 100 UG/ML
INJECTION, SOLUTION INTRAVENOUS AS NEEDED
Status: DISCONTINUED | OUTPATIENT
Start: 2023-03-01 | End: 2023-03-01 | Stop reason: HOSPADM

## 2023-03-01 RX ORDER — EPINEPHRINE 0.1 MG/ML
1 INJECTION INTRACARDIAC; INTRAVENOUS
Status: DISCONTINUED | OUTPATIENT
Start: 2023-03-01 | End: 2023-03-01 | Stop reason: HOSPADM

## 2023-03-01 RX ORDER — DEXTROMETHORPHAN/PSEUDOEPHED 2.5-7.5/.8
1.2 DROPS ORAL
Status: DISCONTINUED | OUTPATIENT
Start: 2023-03-01 | End: 2023-03-01 | Stop reason: HOSPADM

## 2023-03-01 RX ORDER — NALOXONE HYDROCHLORIDE 0.4 MG/ML
0.4 INJECTION, SOLUTION INTRAMUSCULAR; INTRAVENOUS; SUBCUTANEOUS
Status: DISCONTINUED | OUTPATIENT
Start: 2023-03-01 | End: 2023-03-01 | Stop reason: HOSPADM

## 2023-03-01 RX ORDER — ONDANSETRON 2 MG/ML
INJECTION INTRAMUSCULAR; INTRAVENOUS AS NEEDED
Status: DISCONTINUED | OUTPATIENT
Start: 2023-03-01 | End: 2023-03-01 | Stop reason: HOSPADM

## 2023-03-01 RX ORDER — PROPOFOL 10 MG/ML
INJECTION, EMULSION INTRAVENOUS AS NEEDED
Status: DISCONTINUED | OUTPATIENT
Start: 2023-03-01 | End: 2023-03-01 | Stop reason: HOSPADM

## 2023-03-01 RX ORDER — MIDAZOLAM HYDROCHLORIDE 1 MG/ML
.25-5 INJECTION, SOLUTION INTRAMUSCULAR; INTRAVENOUS
Status: DISCONTINUED | OUTPATIENT
Start: 2023-03-01 | End: 2023-03-01 | Stop reason: HOSPADM

## 2023-03-01 RX ORDER — LIDOCAINE HYDROCHLORIDE 20 MG/ML
INJECTION, SOLUTION EPIDURAL; INFILTRATION; INTRACAUDAL; PERINEURAL AS NEEDED
Status: DISCONTINUED | OUTPATIENT
Start: 2023-03-01 | End: 2023-03-01 | Stop reason: HOSPADM

## 2023-03-01 RX ORDER — ATROPINE SULFATE 0.1 MG/ML
0.5 INJECTION INTRAVENOUS
Status: DISCONTINUED | OUTPATIENT
Start: 2023-03-01 | End: 2023-03-01 | Stop reason: HOSPADM

## 2023-03-01 RX ADMIN — LIDOCAINE HYDROCHLORIDE 50 MG: 20 INJECTION, SOLUTION EPIDURAL; INFILTRATION; INTRACAUDAL; PERINEURAL at 08:28

## 2023-03-01 RX ADMIN — PROPOFOL 100 MG: 10 INJECTION, EMULSION INTRAVENOUS at 08:28

## 2023-03-01 RX ADMIN — PROPOFOL 30 MG: 10 INJECTION, EMULSION INTRAVENOUS at 08:34

## 2023-03-01 RX ADMIN — SODIUM CHLORIDE: 9 INJECTION, SOLUTION INTRAVENOUS at 08:23

## 2023-03-01 RX ADMIN — DEXMEDETOMIDINE 8 MCG: 100 INJECTION, SOLUTION INTRAVENOUS at 08:24

## 2023-03-01 RX ADMIN — ONDANSETRON HYDROCHLORIDE 4 MG: 2 SOLUTION INTRAMUSCULAR; INTRAVENOUS at 08:24

## 2023-03-01 RX ADMIN — PROPOFOL 50 MG: 10 INJECTION, EMULSION INTRAVENOUS at 08:30

## 2023-03-01 NOTE — PERIOP NOTES
Anesthesia staff at patient's bedside administering anesthesia and monitoring patients vital signs throughout procedure. See anesthesia note. Endoscope was pre-cleaned at bedside immediately following procedure by Marga Nguyễn. Pt transported to recovery, placed on bedside cardiac monitor. VSS. Report given to PACU CLAYTON Chadwick using SBAR format. Opportunity for questions provided, and answered.

## 2023-03-01 NOTE — H&P
[address] [facility]  [address], [address] [facility]  [phone]                     History and Physical     NAME: Zhanna Duarte   :  1955   MRN:  [id number]     HPI:   75 yo female who presents today for evaluation of abdominal pain. She states she has chronic GERD. Has been noticing reflux. Has had some nausea. Occasional vomiting. She states she was diagnosed with gastroparesis about 7 years ago. This was diagnosed in [place]. She states she has typically been able mitigate symptoms with dietary modifications. Pt had last EGD - showed pyloric stenosis ( dilated) and peptic ulcer disease. Benign bx negative HP.        Past Surgical History:   Procedure Laterality Date    COLONOSCOPY N/A 2022    COLONOSCOPY performed by Eleonora Barbosa MD at [facility] ENDOSCOPY    HX APPENDECTOMY      HX BILATERAL SALPINGO-OOPHORECTOMY      HX CARPAL TUNNEL RELEASE Right     HX DILATION AND CURETTAGE      HX HYSTERECTOMY      LMP-30years old    HX OTHER SURGICAL      Surgery repairs on both knees/Lateral Release    HX SHOULDER ARTHROSCOPY       Past Medical History:   Diagnosis Date    Arthritis     Depression     Gastroparesis     Hip fracture ([address]     March, right    HTN (hypertension)     Insomnia     Menopause     LMP-30years old    Nausea & vomiting     violently ill     Social History     Tobacco Use    Smoking status: Former     Packs/day: 0.50     Types: Cigarettes     Quit date:      Years since quittin.1    Smokeless tobacco: Never   Vaping Use    Vaping Use: Never used   Substance Use Topics    Alcohol use: Not Currently    Drug use: Never     Allergies   Allergen Reactions    Other Medication Other (comments)     Steroid injections, migraine(  was worried for a stroke ) not sure which steroid     Corn Starch Rash    Pcn [Penicillins] Rash     Family History   Problem Relation Age of Onset    Diabetes Mother     Heart Disease Mother     COPD Mother Hypertension Father     Kidney Disease Father     Heart Attack Brother     Breast Cancer Cousin         Also had ovarian cancer--age unknown--maternal     Breast Cancer Cousin         age unknown--maternal    Breast Cancer Cousin         age unknown--maternal     No current facility-administered medications for this encounter. PHYSICAL EXAM:  General: WD, WN. Alert, cooperative, no acute distress    HEENT: NC, Atraumatic. PERRLA, EOMI. Anicteric sclerae. Lungs:  CTA Bilaterally. No Wheezing/Rhonchi/Rales. Heart:  Regular  rhythm,  No murmur, No Rubs, No Gallops  Abdomen: Soft, Non distended, Non tender. +Bowel sounds, no HSM  Extremities: No c/c/e  Neurologic:  CN 2-12 gi, Alert and oriented X 3. No acute neurological distress   Psych:   Good insight. Not anxious nor agitated. Assessment:   I have reviewed with the patient +/- family alternatives,benefits and risks for the procedure, as well as potential complications(with emphasis on, but not limited to, bleeding, perforation, cardiovascular/cerebrovascular/pulmonary events, reactions to the medications, infection, risk of missing a lesions/a cancer, and the imponderables including death), alternative options, and patient/family voices understanding.       Plan:   Endoscopic procedure  Conscious sedation or MAC

## 2023-03-01 NOTE — PROGRESS NOTES
Endoscopy discharge instructions have been reviewed and given to patient. The patient verbalized understanding and acceptance of instructions. Dr. Jose Alejandro Gomez discussed with patient procedure findings and next steps.

## 2023-03-01 NOTE — DISCHARGE INSTRUCTIONS
[facility]    [phone]      Colonoscopy and Egd Discharge Instructions    3/1/2023    Pia Tubbs  :  1955  Jeannie Medical Record Number:  [id number]      Discomfort:  Sore throat- throat lozenges or warm salt water gargle  Redness at IV site- apply warm compress to area; if redness or soreness persist- contact your physician  There may be a slight amount of blood passed from the rectum  Gaseous discomfort- walking, belching will help relieve any discomfort  You may not operate a vehicle for 12 hours  You may not engage in an occupation involving machinery or appliances for rest of today  You may not drink alcoholic beverages for at least 12 hours  Avoid making any critical decisions for at least 24 hour  DIET:   GERD diet: avoid fried and fatty foods. peppermint, chocolate, alcohol, coffee, citrus fruits and juices, tomoato products; avoid lying down for 2 to 3 hours after eating. Low residue diet   - however -  remember your colon is empty and a heavy meal will produce gas. Avoid these foods:  vegetables, fried / greasy foods, carbonated drinks for today     ACTIVITY:  You may  resume your normal daily activities it is recommended that you spend the remainder of the day resting -  avoid any strenuous activity and driving. CALL M.D. ANY SIGN OF:   Increasing pain, nausea, vomiting  Abdominal distension (swelling)  New increased bleeding (oral or rectal)  Fever (chills)  Pain in chest area  Bloody discharge from nose or mouth  Shortness of breath        Pia Tubbs  [id number]  1955      Follow-up Instructions:   Call Dr. Jody Rosa if any questions at [phone]. Results of procedure / biopsy in 7 to 10 days, we will call you with these results.   Your endoscopy showed recurrent pyloric ( stomach outlet) narrowing ( stricture) - dilated to 12 mm      DISCHARGE SUMMARY from Nurse    The following personal items collected during your admission are returned to you:   Dental Appliance: Dental Appliances: None  Vision: Visual Aid: None  Hearing Aid:    Jewelry:    Clothing:    Other Valuables:    Valuables sent to safe:      PATIENT INSTRUCTIONS:    Take Home Medications:

## 2023-03-01 NOTE — ANESTHESIA PREPROCEDURE EVALUATION
Relevant Problems   No relevant active problems       Anesthetic History     PONV          Review of Systems / Medical History  Patient summary reviewed and nursing notes reviewed    Pulmonary          Shortness of breath      Comments: Due to COVID 1/2022   Neuro/Psych         Psychiatric history    Comments: Anxiety/depression  Chronic insomnia Cardiovascular    Hypertension: well controlled  Valvular problems/murmurs            Exercise tolerance: >4 METS     GI/Hepatic/Renal           PUD    Comments: Gastroparesis Endo/Other        Arthritis     Other Findings              Physical Exam    Airway  Mallampati: II    Neck ROM: normal range of motion   Mouth opening: Normal     Cardiovascular    Rhythm: regular  Rate: normal         Dental         Pulmonary  Breath sounds clear to auscultation               Abdominal         Other Findings            Anesthetic Plan    ASA: 2  Anesthesia type: MAC            Anesthetic plan and risks discussed with: Patient      Informed consent obtained.

## 2023-03-01 NOTE — ANESTHESIA POSTPROCEDURE EVALUATION
Procedure(s):  ESOPHAGOGASTRODUODENOSCOPY (EGD)  ESOPHAGEAL DILATION  ESOPHAGOGASTRODUODENAL (EGD) BIOPSY. MAC    Anesthesia Post Evaluation      Multimodal analgesia: multimodal analgesia not used between 6 hours prior to anesthesia start to PACU discharge  Patient location during evaluation: PACU  Patient participation: complete - patient participated  Level of consciousness: awake and alert  Pain score: 0  Pain management: adequate  Airway patency: patent  Anesthetic complications: no  Cardiovascular status: hemodynamically stable and acceptable  Respiratory status: acceptable  Hydration status: acceptable  Comments: Patient seen and evaluated in PACU bay 4; no concerns. Late entry. Post anesthesia nausea and vomiting:  none      INITIAL Post-op Vital signs:   Vitals Value Taken Time   /66 03/01/23 0900   Temp 36.5 °C (97.7 °F) 03/01/23 0845   Pulse 60 03/01/23 0901   Resp 13 03/01/23 0901   SpO2 100 % 03/01/23 0901   Vitals shown include unvalidated device data.

## 2023-03-01 NOTE — PROCEDURES
[address]  [address] [facility], [address] [place]       [phone]                   3/1/2023                EGD Operative Report  Kimmie Erwin  :  1955  Jg Rodriguez Medical Record Number:  [id number]      Indication:  Abdominal pain, RUQ     : Prudencio Cui MD    Assistants: None    Referring Provider:  Sheri Cates NP      Anesthesia/Sedation:  MAC anesthesia Propofol    Airway assessment: No airway problems anticipated    Pre-Procedural Exam:      Airway: clear, no airway problems anticipated  Heart: RRR, without gallops or rubs  Lungs: clear bilaterally without wheezes, crackles, or rhonchi  Abdomen: soft, nontender, nondistended, bowel sounds present  Mental Status: awake, alert and oriented to person, place and time       Procedure Details     After infomed consent was obtained for the procedure, with all risks and benefits of procedure explained the patient was taken to the endoscopy suite and placed in the left lateral decubitus position. Following sequential administration of sedation as per above, the endoscope was inserted into the mouth and advanced under direct vision to second portion of the duodenum. A careful inspection was made as the gastroscope was withdrawn, including a retroflexed view of the proximal stomach; findings and interventions are described below. Findings:   Esophagus:normal  Stomach: normal  and stricture in   pylorus - benign appearing. Bx taken and dilation performed TSS Balloon Dilator 10-12 mm   Rest of the examined stomach was normal  Duodenum/jejunum: normal    Therapies:  biopsy of stomach pre pyloric antrum    Specimens: as above    Implants: none           Complications:   None; patient tolerated the procedure well. EBL:  None. Impression:      Pyloric stricture ( dilated)     Recommendations:    -Acid suppression with a proton pump inhibitor. ,   -GERD diet: avoid fried and fatty foods. peppermint, chocolate, alcohol, coffee, citrus fruits and juices, tomoato products; avoid lying down for 2 to 3 hours after eating.,   -No NSAIDS  -Small frequent meals with low residue  -UGI series in 3 months   -Follow up in office in 3-6 months  -Call office for pathology results      Victorino Gonzalez MD

## 2023-03-01 NOTE — PROGRESS NOTES
Rhea Salmon  1955  [id number]    Situation:  Verbal report received from: Katina Navarrete RN   Procedure: Procedure(s):  ESOPHAGOGASTRODUODENOSCOPY (EGD)  ESOPHAGEAL DILATION  ESOPHAGOGASTRODUODENAL (EGD) BIOPSY    Background:    Preoperative diagnosis: Gastroesophageal reflux disease with esophagitis, unspecified whether hemorrhage [K21.00]  Postoperative diagnosis: * No post-op diagnosis entered *    :  Dr. Cornelio Sanchez  Assistant(s): Endoscopy Technician-1: Rebeka Eng  Endoscopy RN-1: Genesis Mendieta RN    Specimens: * No specimens in log *  H. Pylori  no    Assessment:    Anesthesia gave intra-procedure sedation and medications, see anesthesia flow sheet no    Intravenous fluids: NS@ KVO     Vital signs stable     Abdominal assessment: round and soft     Recommendation:  Discharge patient per MD order.   Return to floor  Family or Friend   Permission to share finding with family or friend yes

## 2023-03-06 ENCOUNTER — TRANSCRIBE ORDER (OUTPATIENT)
Dept: SCHEDULING | Age: 68
End: 2023-03-06

## 2023-03-06 ENCOUNTER — TELEPHONE (OUTPATIENT)
Dept: FAMILY MEDICINE CLINIC | Age: 68
End: 2023-03-06

## 2023-03-06 DIAGNOSIS — N63.20 MASS OF LEFT BREAST, UNSPECIFIED QUADRANT: Primary | ICD-10-CM

## 2023-03-06 DIAGNOSIS — K25.3: Primary | ICD-10-CM

## 2023-03-06 DIAGNOSIS — Z12.31 ENCOUNTER FOR SCREENING MAMMOGRAM FOR MALIGNANT NEOPLASM OF BREAST: ICD-10-CM

## 2023-03-06 DIAGNOSIS — N64.59 OTHER SIGNS AND SYMPTOMS IN BREAST: ICD-10-CM

## 2023-03-06 NOTE — TELEPHONE ENCOUNTER
[facility] is calling in regards to the mammo orders  Mammo lt dx needs to be changed to mammo bi lateral instead with the cpt code [phone] breast LT complete 4 - needs to be changed to [address]     Pt has appt 3/7 and needs order faxed over today 3/6  Fax # [phone]    Questions call Marcus Arnett at [phone]

## 2023-03-11 DIAGNOSIS — M19.90 ARTHRITIS: ICD-10-CM

## 2023-03-11 DIAGNOSIS — F33.1 MODERATE EPISODE OF RECURRENT MAJOR DEPRESSIVE DISORDER (HCC): ICD-10-CM

## 2023-03-11 DIAGNOSIS — G47.00 INSOMNIA, UNSPECIFIED TYPE: ICD-10-CM

## 2023-03-11 DIAGNOSIS — I10 ESSENTIAL HYPERTENSION: ICD-10-CM

## 2023-03-13 RX ORDER — ATENOLOL 50 MG/1
TABLET ORAL
Qty: 90 TABLET | Refills: 1 | Status: SHIPPED | OUTPATIENT
Start: 2023-03-13

## 2023-03-13 RX ORDER — ESZOPICLONE 2 MG/1
2 TABLET, FILM COATED ORAL
Qty: 90 TABLET | Refills: 0 | Status: SHIPPED | OUTPATIENT
Start: 2023-03-13

## 2023-03-13 RX ORDER — DULOXETIN HYDROCHLORIDE 30 MG/1
CAPSULE, DELAYED RELEASE ORAL
Qty: 90 CAPSULE | Refills: 1 | Status: SHIPPED | OUTPATIENT
Start: 2023-03-13

## 2023-03-27 ENCOUNTER — OFFICE VISIT (OUTPATIENT)
Dept: FAMILY MEDICINE CLINIC | Age: 68
End: 2023-03-27
Payer: MEDICARE

## 2023-03-27 VITALS
WEIGHT: 173 LBS | OXYGEN SATURATION: 95 % | BODY MASS INDEX: 28.82 KG/M2 | SYSTOLIC BLOOD PRESSURE: 130 MMHG | DIASTOLIC BLOOD PRESSURE: 70 MMHG | TEMPERATURE: 97.2 F | RESPIRATION RATE: 20 BRPM | HEIGHT: 65 IN | HEART RATE: 49 BPM

## 2023-03-27 DIAGNOSIS — I10 PRIMARY HYPERTENSION: Primary | ICD-10-CM

## 2023-03-27 DIAGNOSIS — M19.90 ARTHRITIS: ICD-10-CM

## 2023-03-27 DIAGNOSIS — M81.0 AGE-RELATED OSTEOPOROSIS WITHOUT CURRENT PATHOLOGICAL FRACTURE: ICD-10-CM

## 2023-03-27 PROBLEM — M25.50 MULTIPLE JOINT PAIN: Status: ACTIVE | Noted: 2019-12-04

## 2023-03-27 PROCEDURE — 1090F PRES/ABSN URINE INCON ASSESS: CPT | Performed by: FAMILY MEDICINE

## 2023-03-27 PROCEDURE — G8427 DOCREV CUR MEDS BY ELIG CLIN: HCPCS | Performed by: FAMILY MEDICINE

## 2023-03-27 PROCEDURE — G9899 SCRN MAM PERF RSLTS DOC: HCPCS | Performed by: FAMILY MEDICINE

## 2023-03-27 PROCEDURE — 3074F SYST BP LT 130 MM HG: CPT | Performed by: FAMILY MEDICINE

## 2023-03-27 PROCEDURE — 3078F DIAST BP <80 MM HG: CPT | Performed by: FAMILY MEDICINE

## 2023-03-27 PROCEDURE — 3017F COLORECTAL CA SCREEN DOC REV: CPT | Performed by: FAMILY MEDICINE

## 2023-03-27 PROCEDURE — 1123F ACP DISCUSS/DSCN MKR DOCD: CPT | Performed by: FAMILY MEDICINE

## 2023-03-27 PROCEDURE — 1101F PT FALLS ASSESS-DOCD LE1/YR: CPT | Performed by: FAMILY MEDICINE

## 2023-03-27 PROCEDURE — 99213 OFFICE O/P EST LOW 20 MIN: CPT | Performed by: FAMILY MEDICINE

## 2023-03-27 PROCEDURE — G8417 CALC BMI ABV UP PARAM F/U: HCPCS | Performed by: FAMILY MEDICINE

## 2023-03-27 PROCEDURE — G9717 DOC PT DX DEP/BP F/U NT REQ: HCPCS | Performed by: FAMILY MEDICINE

## 2023-03-27 PROCEDURE — G8536 NO DOC ELDER MAL SCRN: HCPCS | Performed by: FAMILY MEDICINE

## 2023-03-27 NOTE — PROGRESS NOTES
Identified pt with two pt identifiers(name and ).     Chief Complaint   Patient presents with    Arthritis     Follow up    Other     Bone spurs    Labs     Patient would like labs  Concerned about Rheumatoid Arthritis  Fasting        Health Maintenance Due   Topic    Shingles Vaccine (1 of 2)    COVID-19 Vaccine (4 - Booster for Moderna series)    Flu Vaccine (1)    Medicare Yearly Exam        Wt Readings from Last 3 Encounters:   23 173 lb (78.5 kg)   23 172 lb 2.9 oz (78.1 kg)   23 170 lb 3.2 oz (77.2 kg)     Temp Readings from Last 3 Encounters:   23 97.2 °F (36.2 °C) (Temporal)   23 97.7 °F (36.5 °C)   23 97.8 °F (36.6 °C) (Temporal)     BP Readings from Last 3 Encounters:   23 130/70   23 127/66   23 122/74     Pulse Readings from Last 3 Encounters:   23 (!) 49   23 (!) 56   23 66         Learning Assessment:  :     Learning Assessment 2020   PRIMARY LEARNER Patient   HIGHEST LEVEL OF EDUCATION - PRIMARY LEARNER  SOME COLLEGE   BARRIERS PRIMARY LEARNER NONE   CO-LEARNER CAREGIVER No   PRIMARY LANGUAGE ENGLISH   LEARNER PREFERENCE PRIMARY LISTENING     DEMONSTRATION   ANSWERED BY patient   RELATIONSHIP SELF       Depression Screening:  :     3 most recent PHQ Screens 3/27/2023   Little interest or pleasure in doing things Not at all   Feeling down, depressed, irritable, or hopeless Not at all   Total Score PHQ 2 0   Trouble falling or staying asleep, or sleeping too much -   Feeling tired or having little energy -   Poor appetite, weight loss, or overeating -   Feeling bad about yourself - or that you are a failure or have let yourself or your family down -   Trouble concentrating on things such as school, work, reading, or watching TV -   Moving or speaking so slowly that other people could have noticed; or the opposite being so fidgety that others notice -   Thoughts of being better off dead, or hurting yourself in some way - PHQ 9 Score -       Fall Risk Assessment:  :     Fall Risk Assessment, last 12 mths 3/27/2023   Able to walk? Yes   Fall in past 12 months? 1   Do you feel unsteady? 1   Are you worried about falling 0   Is TUG test greater than 12 seconds? 0   Is the gait abnormal? 0   Number of falls in past 12 months 2   Fall with injury? 1       Abuse Screening:  :     Abuse Screening Questionnaire 3/27/2023 9/16/2022 2/24/2022 1/14/2022 9/22/2021 5/6/2021 11/17/2020   Do you ever feel afraid of your partner? N N N N N N N   Are you in a relationship with someone who physically or mentally threatens you? N N N N N N N   Is it safe for you to go home? Y Y Y Y Y Y Y       Coordination of Care Questionnaire:  :     1) Have you been to an emergency room, urgent care clinic since your last visit? no   Hospitalized since your last visit? no             2) Have you seen or consulted any other health care providers outside of [address] [place] [address] since your last visit? no EGD gastrologist in March for ulcers, ortho  (Include any pap smears or colon screenings in this section.)    3) Do you have an Advance Directive on file? yes  Are you interested in receiving information about Advance Directives? no    Patient is accompanied by self I have received verbal consent from Eldon Cantu to discuss any/all medical information while they are present in the room. 4.  For patients aged 39-70: Has the patient had a colonoscopy / FIT/ Cologuard? Yes - no Care Gap present      If the patient is female:    5. For patients aged 41-77: Has the patient had a mammogram within the past 2 years? Yes - no Care Gap present      6. For patients aged 21-65: Has the patient had a pap smear?  NA - based on age or sex

## 2023-03-27 NOTE — PATIENT INSTRUCTIONS
Change the way you are taking atenolol: from night to morning  Start taking 2 duloxetine  Call us in a week

## 2023-03-28 RX ORDER — ERGOCALCIFEROL 1.25 MG/1
50000 CAPSULE ORAL
Qty: 12 CAPSULE | Refills: 0 | Status: SHIPPED | OUTPATIENT
Start: 2023-03-28

## 2023-03-28 NOTE — PROGRESS NOTES
Victorina Crenshaw (: 1955) is a 69 y.o. female, established patient, here for evaluation of the following chief complaint(s):  Arthritis (Follow up), Other (Bone spurs), and Labs (Patient would like labs/Concerned about Rheumatoid Arthritis/Fasting)       ASSESSMENT/PLAN:  Below is the assessment and plan developed based on review of pertinent history, physical exam, labs, studies, and medications. 1. Primary hypertension  2. Arthritis  -     REFERRAL TO PHYSICAL THERAPY  3. Age-related osteoporosis without current pathological fracture    Discussed physical therapy, and pain management through multiple modalities, thru topical creams and patches, the use of advil and tylenol. Would benefit from massages, and physical therapy. Asked the patient to change the way she is taking atenolol from night to morning, additionally, discussed increasing duloxetine from 30 mg to 60 mg, and will follow up with patient in about a week to see how she has responded to the increase in cymbalta. No follow-ups on file. SUBJECTIVE/OBJECTIVE:  Victorina Crenshaw is a 69 y.o. female presents to the clinic to discuss symptoms of arthritis, back pain and concerns with bone spurs. Patient states the pain is constant, occurring mainly on her back, left heel, hip and knee. Patient takes medications on a daily basis, mostly tylenol and ibuprofen. Doesn't seem to be helping, patient states that she is not interested in pain medications or any controlled substances, she understands that the treatment of pain is mostly multimodal. Should benefit from physical therapy, over the counter tylenol and maybe water therapy. Patient is going to reach out to her insurance to see if she has any allotted funds to help her pay for her physical therapy. States that she is unable to pay for it. Review of Systems   Constitutional: Negative. HENT: Negative. Eyes: Negative. Respiratory: Negative. Cardiovascular: Negative. Endocrine: Negative. Genitourinary: Negative. Musculoskeletal:  Positive for arthralgias, back pain, gait problem, joint swelling, myalgias, neck pain and neck stiffness. Skin: Negative. Allergic/Immunologic: Negative. Hematological: Negative. Psychiatric/Behavioral: Negative. Physical Exam  Constitutional:       Appearance: Normal appearance. She is normal weight. HENT:      Head: Normocephalic and atraumatic. Right Ear: External ear normal.      Left Ear: External ear normal.      Nose: Nose normal.      Mouth/Throat:      Mouth: Mucous membranes are moist.      Pharynx: Oropharynx is clear. Eyes:      Extraocular Movements: Extraocular movements intact. Conjunctiva/sclera: Conjunctivae normal.      Pupils: Pupils are equal, round, and reactive to light. Cardiovascular:      Rate and Rhythm: Normal rate and regular rhythm. Pulses: Normal pulses. Pulmonary:      Effort: Pulmonary effort is normal.   Abdominal:      General: Abdomen is flat. Musculoskeletal:         General: Swelling, tenderness and deformity present. Cervical back: Normal range of motion and neck supple. Neurological:      Mental Status: She is alert and oriented to person, place, and time. Mental status is at baseline. Psychiatric:         Mood and Affect: Mood normal.         Behavior: Behavior normal.         Thought Content: Thought content normal.         Judgment: Judgment normal.         An electronic signature was used to authenticate this note.   -- Breonna Barriga MD

## 2023-04-22 DIAGNOSIS — Z12.31 SCREENING MAMMOGRAM FOR HIGH-RISK PATIENT: Primary | ICD-10-CM

## 2023-05-17 ENCOUNTER — PATIENT MESSAGE (OUTPATIENT)
Age: 68
End: 2023-05-17

## 2023-05-18 RX ORDER — DULOXETIN HYDROCHLORIDE 60 MG/1
60 CAPSULE, DELAYED RELEASE ORAL DAILY
Qty: 30 CAPSULE | Refills: 5 | Status: SHIPPED | OUTPATIENT
Start: 2023-05-18

## 2023-05-18 NOTE — TELEPHONE ENCOUNTER
----- Message from Linnea Jordin sent at 5/18/2023 11:47 AM EDT -----  Regarding: FW: Changed meds Duloxetin  Contact: [phone]    ----- Message -----  From: Hakeem Gonzalez  Sent: 5/17/2023   9:59 PM EDT  To: [facility] Clinical Staff  Subject: Changed meds Duloxetin                           Dr Louie Brooke changed my Duloxetin meds from 1 capsule each day to 2 capsules per day. I remember asking about updating my prescription and you were going to send it into [facility]. They do not have the new prescription. Because I have been taking 2 a day I've now run out before they will refill. Please send the prescription into [facility] in [place] as soon as possible.   Thanks  Christie Archuleta

## 2023-05-31 ENCOUNTER — NURSE TRIAGE (OUTPATIENT)
Dept: OTHER | Facility: CLINIC | Age: 68
End: 2023-05-31

## 2023-05-31 NOTE — TELEPHONE ENCOUNTER
[place]    Received call from Robb Khoury at [facility] with [employer] Complaint. Subjective: Caller states \"A couple months ago I had a bad cold that lasted a week. Everything cleared up except my mouth. It was sore. It looks like I am wearing lip stick. It reminds me of a sunburn. They peel. I use chapstick. \"     Current Symptoms: bilateral lips chapped and swollen. Red. Onset: several months ago; worsening    Associated Symptoms: no tongue swelling. No difficulty breathing. Pain Severity: 2/10; burning; constant    Temperature: denies     What has been tried: Chapstick. Called Dermatologist:earliest appt Jan 2024    LMP: NA Pregnant: NA    Recommended disposition: See PCP within 3 Days    Care advice provided, patient verbalizes understanding; denies any other questions or concerns; instructed to call back for any new or worsening symptoms. Patient/Caller agrees with recommended disposition; [facility] for [facility] scheduling    Attention Provider: Thank you for allowing me to participate in the care of your patient. The patient was connected to triage in response to information provided to the ECC/PSC. Please do not respond through this encounter as the response is not directed to a shared pool.         Reason for Disposition   Patient wants to be seen    Protocols used: Mouth Symptoms-ADULT-[place]

## 2023-06-09 ENCOUNTER — NURSE ONLY (OUTPATIENT)
Age: 68
End: 2023-06-09

## 2023-06-09 DIAGNOSIS — R79.9 ABNORMAL FINDING OF BLOOD CHEMISTRY, UNSPECIFIED: ICD-10-CM

## 2023-06-09 DIAGNOSIS — I95.9 HYPOTENSION, UNSPECIFIED HYPOTENSION TYPE: ICD-10-CM

## 2023-06-10 LAB
ALBUMIN SERPL-MCNC: 3.9 G/DL (ref 3.5–5)
ALBUMIN/GLOB SERPL: 1.3 (ref 1.1–2.2)
ALP SERPL-CCNC: 76 U/L (ref 45–117)
ALT SERPL-CCNC: 24 U/L (ref 12–78)
ANION GAP SERPL CALC-SCNC: 3 MMOL/L (ref 5–15)
AST SERPL-CCNC: 20 U/L (ref 15–37)
BASOPHILS # BLD: 0.1 K/UL (ref 0–0.1)
BASOPHILS NFR BLD: 1 % (ref 0–1)
BILIRUB SERPL-MCNC: 0.5 MG/DL (ref 0.2–1)
BUN SERPL-MCNC: 19 MG/DL (ref 6–20)
BUN/CREAT SERPL: 22 (ref 12–20)
CALCIUM SERPL-MCNC: 9 MG/DL (ref 8.5–10.1)
CHLORIDE SERPL-SCNC: 107 MMOL/L (ref 97–108)
CHOLEST SERPL-MCNC: 228 MG/DL
CO2 SERPL-SCNC: 30 MMOL/L (ref 21–32)
CREAT SERPL-MCNC: 0.88 MG/DL (ref 0.55–1.02)
DIFFERENTIAL METHOD BLD: NORMAL
EOSINOPHIL # BLD: 0.1 K/UL (ref 0–0.4)
EOSINOPHIL NFR BLD: 2 % (ref 0–7)
ERYTHROCYTE [DISTWIDTH] IN BLOOD BY AUTOMATED COUNT: 12.5 % (ref 11.5–14.5)
EST. AVERAGE GLUCOSE BLD GHB EST-MCNC: 111 MG/DL
GLOBULIN SER CALC-MCNC: 2.9 G/DL (ref 2–4)
GLUCOSE SERPL-MCNC: 107 MG/DL (ref 65–100)
HBA1C MFR BLD: 5.5 % (ref 4–5.6)
HCT VFR BLD AUTO: 46 % (ref 35–47)
HDLC SERPL-MCNC: 67 MG/DL
HDLC SERPL: 3.4 (ref 0–5)
HGB BLD-MCNC: 14.6 G/DL (ref 11.5–16)
IMM GRANULOCYTES # BLD AUTO: 0 K/UL (ref 0–0.04)
IMM GRANULOCYTES NFR BLD AUTO: 0 % (ref 0–0.5)
LDLC SERPL CALC-MCNC: 106 MG/DL (ref 0–100)
LYMPHOCYTES # BLD: 1.1 K/UL (ref 0.8–3.5)
LYMPHOCYTES NFR BLD: 20 % (ref 12–49)
MCH RBC QN AUTO: 30.4 PG (ref 26–34)
MCHC RBC AUTO-ENTMCNC: 31.7 G/DL (ref 30–36.5)
MCV RBC AUTO: 95.8 FL (ref 80–99)
MONOCYTES # BLD: 0.5 K/UL (ref 0–1)
MONOCYTES NFR BLD: 8 % (ref 5–13)
NEUTS SEG # BLD: 4 K/UL (ref 1.8–8)
NEUTS SEG NFR BLD: 69 % (ref 32–75)
NRBC # BLD: 0 K/UL (ref 0–0.01)
NRBC BLD-RTO: 0 PER 100 WBC
PLATELET # BLD AUTO: 222 K/UL (ref 150–400)
PMV BLD AUTO: 11.5 FL (ref 8.9–12.9)
POTASSIUM SERPL-SCNC: 4.4 MMOL/L (ref 3.5–5.1)
PROT SERPL-MCNC: 6.8 G/DL (ref 6.4–8.2)
RBC # BLD AUTO: 4.8 M/UL (ref 3.8–5.2)
SODIUM SERPL-SCNC: 140 MMOL/L (ref 136–145)
TRIGL SERPL-MCNC: 275 MG/DL
VLDLC SERPL CALC-MCNC: 55 MG/DL
WBC # BLD AUTO: 5.8 K/UL (ref 3.6–11)

## 2023-08-07 ENCOUNTER — NURSE TRIAGE (OUTPATIENT)
Dept: OTHER | Facility: CLINIC | Age: 68
End: 2023-08-07

## 2023-08-07 NOTE — TELEPHONE ENCOUNTER
Location of patient: [address] [facility] call from [facility] at [facility] with [employer]. Subjective: Caller states \"I've been having headaches for about a month. \"     Current Symptoms: headache every day, around the eyes and above on the forehead. Pt reports she has fatigue    Onset: a couple of months ago, sporatic at first, but now every day for the last month    Pain Severity:   pain ebbs and flows - starts out in the am and is mild, then throughout the day worses    Temperature:  no fever    What has been tried: Tylenol and ibuprofen works but wears off - trying to take only once a day    Recommended disposition: Go to ED Now    Care advice provided, patient verbalizes understanding; denies any other questions or concerns; instructed to call back for any new or worsening symptoms. To the ED now. Attention Provider: Thank you for allowing me to participate in the care of your patient. The patient was connected to triage in response to information provided to the ECC/PSC. Please do not respond through this encounter as the response is not directed to a shared pool.       Reason for Disposition   Severe pain in one eye    Protocols used: Headache-ADULT-AH

## 2023-09-11 DIAGNOSIS — F51.01 PRIMARY INSOMNIA: ICD-10-CM

## 2023-09-11 DIAGNOSIS — L21.0 DANDRUFF: ICD-10-CM

## 2023-09-15 ENCOUNTER — TELEPHONE (OUTPATIENT)
Age: 68
End: 2023-09-15

## 2023-09-15 ENCOUNTER — PATIENT MESSAGE (OUTPATIENT)
Age: 68
End: 2023-09-15

## 2023-09-15 DIAGNOSIS — F51.01 PRIMARY INSOMNIA: Primary | ICD-10-CM

## 2023-09-15 DIAGNOSIS — L21.0 DANDRUFF: ICD-10-CM

## 2023-09-15 RX ORDER — ESZOPICLONE 1 MG/1
1 TABLET, FILM COATED ORAL NIGHTLY
Qty: 90 TABLET | Refills: 0 | Status: SHIPPED | OUTPATIENT
Start: 2023-09-15 | End: 2023-12-14

## 2023-09-15 RX ORDER — ATENOLOL 50 MG/1
50 TABLET ORAL DAILY
Qty: 90 TABLET | OUTPATIENT
Start: 2023-09-15

## 2023-09-15 RX ORDER — ATENOLOL 50 MG/1
50 TABLET ORAL DAILY
Qty: 120 TABLET | Refills: 0 | Status: SHIPPED | OUTPATIENT
Start: 2023-09-15

## 2023-09-15 RX ORDER — KETOCONAZOLE 20 MG/ML
SHAMPOO TOPICAL
Qty: 120 ML | Refills: 0 | OUTPATIENT
Start: 2023-09-15

## 2023-09-15 RX ORDER — KETOCONAZOLE 20 MG/ML
SHAMPOO TOPICAL
Qty: 120 ML | Refills: 0 | Status: SHIPPED | OUTPATIENT
Start: 2023-09-15

## 2023-09-15 RX ORDER — ESZOPICLONE 2 MG/1
TABLET, FILM COATED ORAL
Qty: 90 TABLET | OUTPATIENT
Start: 2023-09-15 | End: 2023-12-14

## 2023-09-19 DIAGNOSIS — F51.01 PRIMARY INSOMNIA: ICD-10-CM

## 2023-09-19 RX ORDER — ESZOPICLONE 1 MG/1
1 TABLET, FILM COATED ORAL NIGHTLY
Qty: 90 TABLET | Refills: 0 | OUTPATIENT
Start: 2023-09-19 | End: 2023-12-18

## 2023-09-19 RX ORDER — ESZOPICLONE 2 MG/1
2 TABLET, FILM COATED ORAL
Qty: 90 TABLET | OUTPATIENT
Start: 2023-09-19

## 2023-09-19 NOTE — TELEPHONE ENCOUNTER
----- Message from Star Masters sent at 9/18/2023  5:25 PM EDT -----  Regarding: Eszopiclone should be 2mg  Contact: [phone]  Dr Britni Henning sent in the prescription for 1mg. I contacted the office on Friday and spoke to Felicia to let her know it's not the right dosage. She said they would get it straight before they left on Friday 15th. They didn't. The pharmacy has sent a couple of requests starting last Monday. I've been without the medicine for 5 days now. Can you please help me? You are still listed as my PC.   Thanks  Star Masters  [phone]

## 2023-09-20 ENCOUNTER — OFFICE VISIT (OUTPATIENT)
Age: 68
End: 2023-09-20

## 2023-09-20 VITALS
WEIGHT: 170 LBS | HEIGHT: 65 IN | BODY MASS INDEX: 28.32 KG/M2 | OXYGEN SATURATION: 97 % | TEMPERATURE: 98.9 F | SYSTOLIC BLOOD PRESSURE: 114 MMHG | HEART RATE: 81 BPM | DIASTOLIC BLOOD PRESSURE: 73 MMHG | RESPIRATION RATE: 22 BRPM

## 2023-09-20 DIAGNOSIS — J06.9 URI WITH COUGH AND CONGESTION: Primary | ICD-10-CM

## 2023-09-20 DIAGNOSIS — R68.89 FLU-LIKE SYMPTOMS: ICD-10-CM

## 2023-09-20 DIAGNOSIS — J02.9 PHARYNGITIS, UNSPECIFIED ETIOLOGY: ICD-10-CM

## 2023-09-20 LAB
INFLUENZA A ANTIGEN, POC: NEGATIVE
INFLUENZA B ANTIGEN, POC: NEGATIVE
STREP PYOGENES DNA, POC: NEGATIVE
VALID INTERNAL CONTROL, POC: YES
VALID INTERNAL CONTROL, POC: YES

## 2023-09-20 RX ORDER — ALBUTEROL SULFATE 90 UG/1
2 AEROSOL, METERED RESPIRATORY (INHALATION) EVERY 4 HOURS PRN
Qty: 18 G | Refills: 0 | Status: SHIPPED | OUTPATIENT
Start: 2023-09-20

## 2023-09-20 RX ORDER — BENZONATATE 200 MG/1
200 CAPSULE ORAL 3 TIMES DAILY
Qty: 21 CAPSULE | Refills: 0 | Status: SHIPPED | OUTPATIENT
Start: 2023-09-20 | End: 2023-09-27

## 2023-09-20 RX ORDER — DEXTROMETHORPHAN HYDROBROMIDE AND PROMETHAZINE HYDROCHLORIDE 15; 6.25 MG/5ML; MG/5ML
5 SYRUP ORAL
Qty: 120 ML | Refills: 0 | Status: SHIPPED | OUTPATIENT
Start: 2023-09-20 | End: 2023-09-27

## 2023-09-20 ASSESSMENT — VISUAL ACUITY
OS_CC: 20/30
OD_CC: 20/30

## 2023-09-20 ASSESSMENT — ENCOUNTER SYMPTOMS
COUGH: 1
SORE THROAT: 1

## 2023-09-20 NOTE — PROGRESS NOTES
resolve on it's own. Recommended rest, push fluids, and take tylenol or ibuprofen for fever or symptom relief as needed. Instructed to follow up if does not resolve or symptoms worsens.      2. Flu-like symptoms  - AMB POC INFLUENZA A  AND B REAL-TIME RT-PCR    3. Pharyngitis, unspecified etiology  - AMB POC STREP GO A DIRECT, DNA PROBE    DAMIAN Betancourt - NP

## 2023-09-20 NOTE — PATIENT INSTRUCTIONS
- Consider using OTC nasacort daily non drowsy allergy medication such as zyrtec, claritin or allegra. - Nedi pot/nasal saline rinse as needed    - Use a coupon Good RX if prescription prices are too expensive    - Rest/push fluids     - take OTC medication tylenol/ibuprofen for pain/discomfort as needed.

## 2023-09-22 ASSESSMENT — VISUAL ACUITY: OU: 1

## 2023-09-24 ENCOUNTER — OFFICE VISIT (OUTPATIENT)
Age: 68
End: 2023-09-24

## 2023-09-24 VITALS
HEART RATE: 83 BPM | DIASTOLIC BLOOD PRESSURE: 65 MMHG | BODY MASS INDEX: 28.79 KG/M2 | OXYGEN SATURATION: 93 % | TEMPERATURE: 98.1 F | SYSTOLIC BLOOD PRESSURE: 108 MMHG | WEIGHT: 173 LBS | RESPIRATION RATE: 18 BRPM

## 2023-09-24 DIAGNOSIS — J01.90 ACUTE BACTERIAL SINUSITIS: Primary | ICD-10-CM

## 2023-09-24 DIAGNOSIS — B96.89 ACUTE BACTERIAL SINUSITIS: Primary | ICD-10-CM

## 2023-09-24 LAB
Lab: NORMAL
PERFORMING INSTRUMENT: NORMAL
QC PASS/FAIL: NORMAL
SARS-COV-2, POC: NORMAL

## 2023-09-24 RX ORDER — AZITHROMYCIN 250 MG/1
250 TABLET, FILM COATED ORAL SEE ADMIN INSTRUCTIONS
Qty: 6 TABLET | Refills: 0 | Status: SHIPPED | OUTPATIENT
Start: 2023-09-24 | End: 2023-09-29

## 2023-09-24 ASSESSMENT — ENCOUNTER SYMPTOMS
COUGH: 1
SORE THROAT: 1
SINUS PAIN: 1
SINUS PRESSURE: 1
EYES NEGATIVE: 1
ALLERGIC/IMMUNOLOGIC NEGATIVE: 1
GASTROINTESTINAL NEGATIVE: 1
RHINORRHEA: 1

## 2023-11-06 ENCOUNTER — OFFICE VISIT (OUTPATIENT)
Age: 68
End: 2023-11-06
Payer: MEDICARE

## 2023-11-06 VITALS
HEART RATE: 61 BPM | OXYGEN SATURATION: 96 % | SYSTOLIC BLOOD PRESSURE: 112 MMHG | BODY MASS INDEX: 28.82 KG/M2 | DIASTOLIC BLOOD PRESSURE: 63 MMHG | WEIGHT: 173 LBS | HEIGHT: 65 IN

## 2023-11-06 DIAGNOSIS — J32.1 CHRONIC FRONTAL SINUSITIS: ICD-10-CM

## 2023-11-06 DIAGNOSIS — R49.0 DYSPHONIA: Primary | ICD-10-CM

## 2023-11-06 DIAGNOSIS — R05.3 CHRONIC COUGH: ICD-10-CM

## 2023-11-06 DIAGNOSIS — K21.9 LARYNGOPHARYNGEAL REFLUX (LPR): ICD-10-CM

## 2023-11-06 PROCEDURE — 3074F SYST BP LT 130 MM HG: CPT | Performed by: STUDENT IN AN ORGANIZED HEALTH CARE EDUCATION/TRAINING PROGRAM

## 2023-11-06 PROCEDURE — 31575 DIAGNOSTIC LARYNGOSCOPY: CPT | Performed by: STUDENT IN AN ORGANIZED HEALTH CARE EDUCATION/TRAINING PROGRAM

## 2023-11-06 PROCEDURE — 1123F ACP DISCUSS/DSCN MKR DOCD: CPT | Performed by: STUDENT IN AN ORGANIZED HEALTH CARE EDUCATION/TRAINING PROGRAM

## 2023-11-06 PROCEDURE — 3078F DIAST BP <80 MM HG: CPT | Performed by: STUDENT IN AN ORGANIZED HEALTH CARE EDUCATION/TRAINING PROGRAM

## 2023-11-06 PROCEDURE — 99204 OFFICE O/P NEW MOD 45 MIN: CPT | Performed by: STUDENT IN AN ORGANIZED HEALTH CARE EDUCATION/TRAINING PROGRAM

## 2023-11-06 RX ORDER — SOD CHLOR,BICARB/SQUEEZ BOTTLE
1 PACKET, WITH RINSE DEVICE NASAL 2 TIMES DAILY
Qty: 1 EACH | Refills: 3 | Status: SHIPPED | OUTPATIENT
Start: 2023-11-06

## 2023-11-06 RX ORDER — SOD CHLOR,BICARB/SQUEEZ BOTTLE
1 PACKET, WITH RINSE DEVICE NASAL 2 TIMES DAILY
Qty: 100 EACH | Refills: 3 | Status: SHIPPED | OUTPATIENT
Start: 2023-11-06

## 2023-11-06 RX ORDER — FLUTICASONE PROPIONATE 50 MCG
2 SPRAY, SUSPENSION (ML) NASAL 2 TIMES DAILY
Qty: 16 G | Refills: 0 | Status: SHIPPED | OUTPATIENT
Start: 2023-11-06

## 2023-11-06 RX ORDER — PANTOPRAZOLE SODIUM 40 MG/1
40 TABLET, DELAYED RELEASE ORAL
Qty: 90 TABLET | Refills: 0 | Status: SHIPPED | OUTPATIENT
Start: 2023-11-06

## 2023-11-13 ENCOUNTER — HOSPITAL ENCOUNTER (OUTPATIENT)
Facility: HOSPITAL | Age: 68
Discharge: HOME | End: 2023-11-16
Attending: STUDENT IN AN ORGANIZED HEALTH CARE EDUCATION/TRAINING PROGRAM
Payer: MEDICARE

## 2023-11-13 DIAGNOSIS — J32.1 CHRONIC FRONTAL SINUSITIS: ICD-10-CM

## 2023-11-13 PROCEDURE — 70486 CT MAXILLOFACIAL W/O DYE: CPT

## 2023-11-24 DIAGNOSIS — F51.01 PRIMARY INSOMNIA: ICD-10-CM

## 2023-11-25 RX ORDER — ESZOPICLONE 1 MG/1
1 TABLET, FILM COATED ORAL NIGHTLY
Qty: 60 TABLET | OUTPATIENT
Start: 2023-11-25

## 2023-11-25 RX ORDER — ATENOLOL 50 MG/1
50 TABLET ORAL DAILY
Qty: 90 TABLET | OUTPATIENT
Start: 2023-11-25

## 2023-12-05 NOTE — PROGRESS NOTES
Idris Odor  1955  [id number]    Situation:  Verbal report received from: [address] RN   Procedure: Procedure(s):  COLONOSCOPY  ESOPHAGOGASTRODUODENOSCOPY (EGD)  ESOPHAGOGASTRODUODENAL (EGD) BIOPSY  ESOPHAGEAL DILATION    Background:    Preoperative diagnosis: HISTORY OF COLON POLYPS, DYSPEPSIA  Postoperative diagnosis: gastric ulcer  pyloric stenosis  diverticulosis    :  Dr. Enid Early  Assistant(s): Endoscopy RN-1: Yecenia Wright RN  Endoscopy RN-2: Bailee Ruano RN    Specimens:   ID Type Source Tests Collected by Time Destination   1 : gastric antrum ulcer Preservative   Paulette Hernandez MD 6/22/2022 0809 Pathology     H. Pylori  yes    Assessment:  Intra-procedure medications   Versed yes 2 mg  Fentanyl yes 100 mcg    Anesthesia gave intra-procedure sedation and medications, see anesthesia flow sheet no    Intravenous fluids: NS@ KVO     Vital signs stable     Abdominal assessment: round and soft     Recommendation:  Discharge patient per MD order.   Return to floor  Family or Friend   Permission to share finding with family or friend yes 08-Nov-2023

## 2023-12-27 RX ORDER — DULOXETIN HYDROCHLORIDE 60 MG/1
60 CAPSULE, DELAYED RELEASE ORAL DAILY
Qty: 90 CAPSULE | OUTPATIENT
Start: 2023-12-27

## 2024-01-02 RX ORDER — DULOXETIN HYDROCHLORIDE 60 MG/1
60 CAPSULE, DELAYED RELEASE ORAL DAILY
Qty: 90 CAPSULE | Refills: 2 | Status: SHIPPED | OUTPATIENT
Start: 2024-01-02

## 2025-03-14 ENCOUNTER — TRANSCRIBE ORDERS (OUTPATIENT)
Facility: HOSPITAL | Age: 70
End: 2025-03-14

## 2025-03-14 DIAGNOSIS — R10.11 RUQ ABDOMINAL PAIN: Primary | ICD-10-CM

## 2025-04-01 ENCOUNTER — HOSPITAL ENCOUNTER (OUTPATIENT)
Facility: HOSPITAL | Age: 70
Discharge: HOME | End: 2025-04-04
Attending: INTERNAL MEDICINE
Payer: MEDICARE

## 2025-04-01 DIAGNOSIS — R10.11 RUQ ABDOMINAL PAIN: ICD-10-CM

## 2025-04-01 PROCEDURE — 6360000004 HC RX CONTRAST MEDICATION: Performed by: INTERNAL MEDICINE

## 2025-04-01 PROCEDURE — A9537 TC99M MEBROFENIN: HCPCS | Performed by: INTERNAL MEDICINE

## 2025-04-01 PROCEDURE — 3430000000 HC RX DIAGNOSTIC RADIOPHARMACEUTICAL: Performed by: INTERNAL MEDICINE

## 2025-04-01 PROCEDURE — 78227 HEPATOBIL SYST IMAGE W/DRUG: CPT

## 2025-04-01 RX ORDER — SINCALIDE 5 UG/5ML
0.02 INJECTION, POWDER, LYOPHILIZED, FOR SOLUTION INTRAVENOUS ONCE
Status: COMPLETED | OUTPATIENT
Start: 2025-04-01 | End: 2025-04-01

## 2025-04-01 RX ORDER — KIT FOR THE PREPARATION OF TECHNETIUM TC 99M MEBROFENIN 45 MG/10ML
6 INJECTION, POWDER, LYOPHILIZED, FOR SOLUTION INTRAVENOUS ONCE
Status: COMPLETED | OUTPATIENT
Start: 2025-04-01 | End: 2025-04-01

## 2025-04-01 RX ORDER — WATER 10 ML/10ML
10 INJECTION INTRAMUSCULAR; INTRAVENOUS; SUBCUTANEOUS ONCE
Status: DISCONTINUED | OUTPATIENT
Start: 2025-04-01 | End: 2025-04-05 | Stop reason: HOSPADM

## 2025-04-01 RX ADMIN — SINCALIDE 1.57 MCG: 5 INJECTION, POWDER, LYOPHILIZED, FOR SOLUTION INTRAVENOUS at 10:46

## 2025-04-01 RX ADMIN — MEBROFENIN 6 MILLICURIE: 45 INJECTION, POWDER, LYOPHILIZED, FOR SOLUTION INTRAVENOUS at 08:34

## 2025-07-10 ENCOUNTER — HOSPITAL ENCOUNTER (OUTPATIENT)
Facility: HOSPITAL | Age: 70
Discharge: HOME | End: 2025-07-13
Payer: MEDICARE

## 2025-07-10 VITALS
DIASTOLIC BLOOD PRESSURE: 70 MMHG | HEART RATE: 58 BPM | SYSTOLIC BLOOD PRESSURE: 113 MMHG | BODY MASS INDEX: 28.14 KG/M2 | TEMPERATURE: 97.1 F | HEIGHT: 65 IN | RESPIRATION RATE: 16 BRPM | WEIGHT: 168.9 LBS | OXYGEN SATURATION: 99 %

## 2025-07-10 LAB
ANION GAP SERPL CALC-SCNC: 2 MMOL/L (ref 2–12)
APTT PPP: 25 SEC (ref 22.1–31)
BASOPHILS # BLD: 0.05 K/UL (ref 0–0.1)
BASOPHILS NFR BLD: 0.9 % (ref 0–1)
BUN SERPL-MCNC: 21 MG/DL (ref 6–20)
BUN/CREAT SERPL: 24 (ref 12–20)
CALCIUM SERPL-MCNC: 9 MG/DL (ref 8.5–10.1)
CHLORIDE SERPL-SCNC: 108 MMOL/L (ref 97–108)
CO2 SERPL-SCNC: 31 MMOL/L (ref 21–32)
CREAT SERPL-MCNC: 0.87 MG/DL (ref 0.55–1.02)
DIFFERENTIAL METHOD BLD: NORMAL
EKG ATRIAL RATE: 57 BPM
EKG DIAGNOSIS: NORMAL
EKG P AXIS: 36 DEGREES
EKG P-R INTERVAL: 144 MS
EKG Q-T INTERVAL: 430 MS
EKG QRS DURATION: 88 MS
EKG QTC CALCULATION (BAZETT): 418 MS
EKG R AXIS: -14 DEGREES
EKG T AXIS: 33 DEGREES
EKG VENTRICULAR RATE: 57 BPM
EOSINOPHIL # BLD: 0.06 K/UL (ref 0–0.4)
EOSINOPHIL NFR BLD: 1.1 % (ref 0–7)
ERYTHROCYTE [DISTWIDTH] IN BLOOD BY AUTOMATED COUNT: 12.5 % (ref 11.5–14.5)
GLUCOSE SERPL-MCNC: 116 MG/DL (ref 65–100)
HCT VFR BLD AUTO: 41.9 % (ref 35–47)
HGB BLD-MCNC: 13.8 G/DL (ref 11.5–16)
IMM GRANULOCYTES # BLD AUTO: 0.02 K/UL (ref 0–0.04)
IMM GRANULOCYTES NFR BLD AUTO: 0.4 % (ref 0–0.5)
INR PPP: 1 (ref 0.9–1.1)
LYMPHOCYTES # BLD: 1.03 K/UL (ref 0.8–3.5)
LYMPHOCYTES NFR BLD: 18.6 % (ref 12–49)
MCH RBC QN AUTO: 30.7 PG (ref 26–34)
MCHC RBC AUTO-ENTMCNC: 32.9 G/DL (ref 30–36.5)
MCV RBC AUTO: 93.3 FL (ref 80–99)
MONOCYTES # BLD: 0.45 K/UL (ref 0–1)
MONOCYTES NFR BLD: 8.1 % (ref 5–13)
NEUTS SEG # BLD: 3.93 K/UL (ref 1.8–8)
NEUTS SEG NFR BLD: 70.9 % (ref 32–75)
NRBC # BLD: 0 K/UL (ref 0–0.01)
NRBC BLD-RTO: 0 PER 100 WBC
PLATELET # BLD AUTO: 180 K/UL (ref 150–400)
PMV BLD AUTO: 10.6 FL (ref 8.9–12.9)
POTASSIUM SERPL-SCNC: 4.4 MMOL/L (ref 3.5–5.1)
PROTHROMBIN TIME: 10.9 SEC (ref 9.2–11.2)
RBC # BLD AUTO: 4.49 M/UL (ref 3.8–5.2)
SODIUM SERPL-SCNC: 141 MMOL/L (ref 136–145)
THERAPEUTIC RANGE: NORMAL SECS (ref 58–77)
WBC # BLD AUTO: 5.5 K/UL (ref 3.6–11)

## 2025-07-10 PROCEDURE — 93005 ELECTROCARDIOGRAM TRACING: CPT | Performed by: PLASTIC SURGERY

## 2025-07-10 PROCEDURE — 80048 BASIC METABOLIC PNL TOTAL CA: CPT

## 2025-07-10 PROCEDURE — 93010 ELECTROCARDIOGRAM REPORT: CPT | Performed by: SPECIALIST

## 2025-07-10 PROCEDURE — 85730 THROMBOPLASTIN TIME PARTIAL: CPT

## 2025-07-10 PROCEDURE — 36415 COLL VENOUS BLD VENIPUNCTURE: CPT

## 2025-07-10 PROCEDURE — 85610 PROTHROMBIN TIME: CPT

## 2025-07-10 PROCEDURE — 85025 COMPLETE CBC W/AUTO DIFF WBC: CPT

## 2025-07-10 RX ORDER — FAMOTIDINE 20 MG/1
20 TABLET, FILM COATED ORAL 2 TIMES DAILY
COMMUNITY

## 2025-07-10 RX ORDER — ATORVASTATIN CALCIUM 10 MG/1
10 TABLET, FILM COATED ORAL EVERY MORNING
COMMUNITY

## 2025-07-28 NOTE — DISCHARGE INSTRUCTIONS
POST-SURGERY SCHEDULE: Abdominoplasty with liposuction   INSTRUCTIONS FOR THE CAREGIVER     As the designated caregiver, you have several very important responsibilities.  Use this checklist to keep the patient's recovery on schedule.  ___     The patient must rest and walk.  While rest is important in the early stages of healing, equally important is that the patient is ambulatory: meaning that he/she is walking under his/her own strength. The patient must spend 10 minutes every 2 hours engaged in light walking indoors each day, beginning with the day of surgery.  Walk behind or beside the patient in the event he/she becomes unstable or light-headed.   __     The patient must sleep on her back. The patient may find it easier to sleep on a recliner for the first week after surgery.   ___     Fluids are critical following surgery. Make certain the patient drinks plenty of non-carbonated, non- alcoholic, caffeine-free and green tea-free beverages including fruit juices and water, milk and yogurt drinks.  We highly recommend Gatorade or other drinks rich in electrolytes. He/she must consume at least 8 ounces of fluid every 2 hours.  ___ Wear post-treatment garment for swelling control.   ___     Good nutrition is important during recovery. Constipation and bloating are not uncommon after surgery. This can be improved by increasing water intake, restricting salt intake, and eating a nutritious, bland diet of complex carbohydrates such as whole grain pastas, breads and cereals.  An over the counter stool softener can be started before surgery.  If you have constipation Dulcolax over the counter can be taken as directed.  ___     Keep incisions and dressings clean and dry. Incisions may seep fluid and some blood for a short time after surgery.  A soft moistened cloth can be applied to soften dried blood or lightly pat away any seeping fluid.    ___     Sponge bath for 1 week after surgery. Patient may use a sponge bath

## 2025-07-28 NOTE — OP NOTE
Operative Note      Patient: Florinda Rey  YOB: 1955  MRN: [id number]    Date of Procedure: 7/29/2025    Pre-Op Diagnosis Codes:      * Excess skin of abdomen [L98.7]    Post-Op Diagnosis: Same       Procedure(s):  ABDOMINOPLASTY WITH LIPOSUCTION TO THE FLANKS    Surgeon(s):  Wandy Benítez MD    Assistant:   Surgical Assistant: (Unknown)    Anesthesia: General    Estimated Blood Loss (mL): Minimal    Complications: None    Specimens:   * No specimens in log *    Implants:  * No surgical log found *      Drains: * No LDAs found *    Findings:  Present At Time Of Surgery (PATOS) (choose all levels that have infection present):  No infection present  Other Findings: consistent with procedure     Detailed Description of Procedure:   ***    Electronically signed by Wandy Benítez MD on 7/28/2025 at 7:47 AM

## 2025-07-29 ENCOUNTER — ANESTHESIA EVENT (OUTPATIENT)
Facility: HOSPITAL | Age: 70
End: 2025-07-29
Payer: COMMERCIAL

## 2025-07-29 ENCOUNTER — ANESTHESIA (OUTPATIENT)
Facility: HOSPITAL | Age: 70
End: 2025-07-29
Payer: COMMERCIAL

## 2025-07-29 ENCOUNTER — HOSPITAL ENCOUNTER (OUTPATIENT)
Facility: HOSPITAL | Age: 70
Setting detail: OUTPATIENT SURGERY
Discharge: HOME | End: 2025-07-29
Attending: PLASTIC SURGERY | Admitting: PLASTIC SURGERY
Payer: COMMERCIAL

## 2025-07-29 VITALS
DIASTOLIC BLOOD PRESSURE: 48 MMHG | SYSTOLIC BLOOD PRESSURE: 111 MMHG | RESPIRATION RATE: 15 BRPM | WEIGHT: 166.67 LBS | OXYGEN SATURATION: 97 % | TEMPERATURE: 98.2 F | BODY MASS INDEX: 27.73 KG/M2 | HEART RATE: 67 BPM

## 2025-07-29 PROCEDURE — 6360000002 HC RX W HCPCS: Performed by: PLASTIC SURGERY

## 2025-07-29 PROCEDURE — 2500000003 HC RX 250 WO HCPCS: Performed by: PLASTIC SURGERY

## 2025-07-29 PROCEDURE — 3700000000 HC ANESTHESIA ATTENDED CARE: Performed by: PLASTIC SURGERY

## 2025-07-29 PROCEDURE — 2580000003 HC RX 258: Performed by: PLASTIC SURGERY

## 2025-07-29 PROCEDURE — 2580000003 HC RX 258: Performed by: ANESTHESIOLOGY

## 2025-07-29 PROCEDURE — 94761 N-INVAS EAR/PLS OXIMETRY MLT: CPT

## 2025-07-29 PROCEDURE — 7100000001 HC PACU RECOVERY - ADDTL 15 MIN: Performed by: PLASTIC SURGERY

## 2025-07-29 PROCEDURE — 2580000003 HC RX 258: Performed by: NURSE ANESTHETIST, CERTIFIED REGISTERED

## 2025-07-29 PROCEDURE — 3700000001 HC ADD 15 MINUTES (ANESTHESIA): Performed by: PLASTIC SURGERY

## 2025-07-29 PROCEDURE — 7100000010 HC PHASE II RECOVERY - FIRST 15 MIN: Performed by: PLASTIC SURGERY

## 2025-07-29 PROCEDURE — 6360000002 HC RX W HCPCS: Performed by: NURSE ANESTHETIST, CERTIFIED REGISTERED

## 2025-07-29 PROCEDURE — 7100000000 HC PACU RECOVERY - FIRST 15 MIN: Performed by: PLASTIC SURGERY

## 2025-07-29 PROCEDURE — 2709999900 HC NON-CHARGEABLE SUPPLY: Performed by: PLASTIC SURGERY

## 2025-07-29 PROCEDURE — 6360000002 HC RX W HCPCS: Performed by: ANESTHESIOLOGY

## 2025-07-29 PROCEDURE — 3600000003 HC SURGERY LEVEL 3 BASE: Performed by: PLASTIC SURGERY

## 2025-07-29 PROCEDURE — 7100000011 HC PHASE II RECOVERY - ADDTL 15 MIN: Performed by: PLASTIC SURGERY

## 2025-07-29 PROCEDURE — 6370000000 HC RX 637 (ALT 250 FOR IP): Performed by: PLASTIC SURGERY

## 2025-07-29 PROCEDURE — 3600000013 HC SURGERY LEVEL 3 ADDTL 15MIN: Performed by: PLASTIC SURGERY

## 2025-07-29 PROCEDURE — 2500000003 HC RX 250 WO HCPCS: Performed by: NURSE ANESTHETIST, CERTIFIED REGISTERED

## 2025-07-29 RX ORDER — OXYCODONE HYDROCHLORIDE 5 MG/1
5 TABLET ORAL
Status: DISCONTINUED | OUTPATIENT
Start: 2025-07-29 | End: 2025-07-29 | Stop reason: HOSPADM

## 2025-07-29 RX ORDER — DEXAMETHASONE SODIUM PHOSPHATE 4 MG/ML
INJECTION, SOLUTION INTRA-ARTICULAR; INTRALESIONAL; INTRAMUSCULAR; INTRAVENOUS; SOFT TISSUE
Status: DISCONTINUED | OUTPATIENT
Start: 2025-07-29 | End: 2025-07-29 | Stop reason: SDUPTHER

## 2025-07-29 RX ORDER — ACETAMINOPHEN 325 MG/1
650 TABLET ORAL ONCE
Status: DISCONTINUED | OUTPATIENT
Start: 2025-07-29 | End: 2025-07-29 | Stop reason: HOSPADM

## 2025-07-29 RX ORDER — LIDOCAINE HYDROCHLORIDE 20 MG/ML
INJECTION, SOLUTION EPIDURAL; INFILTRATION; INTRACAUDAL; PERINEURAL
Status: DISCONTINUED | OUTPATIENT
Start: 2025-07-29 | End: 2025-07-29 | Stop reason: SDUPTHER

## 2025-07-29 RX ORDER — ONDANSETRON 2 MG/ML
INJECTION INTRAMUSCULAR; INTRAVENOUS
Status: DISCONTINUED | OUTPATIENT
Start: 2025-07-29 | End: 2025-07-29 | Stop reason: SDUPTHER

## 2025-07-29 RX ORDER — ALBUTEROL SULFATE 0.83 MG/ML
2.5 SOLUTION RESPIRATORY (INHALATION)
Status: DISCONTINUED | OUTPATIENT
Start: 2025-07-29 | End: 2025-07-29 | Stop reason: HOSPADM

## 2025-07-29 RX ORDER — GABAPENTIN 300 MG/1
300 CAPSULE ORAL ONCE
Status: COMPLETED | OUTPATIENT
Start: 2025-07-29 | End: 2025-07-29

## 2025-07-29 RX ORDER — ROCURONIUM BROMIDE 10 MG/ML
INJECTION, SOLUTION INTRAVENOUS
Status: DISCONTINUED | OUTPATIENT
Start: 2025-07-29 | End: 2025-07-29 | Stop reason: SDUPTHER

## 2025-07-29 RX ORDER — SODIUM CHLORIDE, SODIUM LACTATE, POTASSIUM CHLORIDE, CALCIUM CHLORIDE 600; 310; 30; 20 MG/100ML; MG/100ML; MG/100ML; MG/100ML
INJECTION, SOLUTION INTRAVENOUS CONTINUOUS
Status: DISCONTINUED | OUTPATIENT
Start: 2025-07-29 | End: 2025-07-29 | Stop reason: HOSPADM

## 2025-07-29 RX ORDER — DROPERIDOL 2.5 MG/ML
0.62 INJECTION, SOLUTION INTRAMUSCULAR; INTRAVENOUS
Status: DISCONTINUED | OUTPATIENT
Start: 2025-07-29 | End: 2025-07-29 | Stop reason: HOSPADM

## 2025-07-29 RX ORDER — LABETALOL HYDROCHLORIDE 5 MG/ML
10 INJECTION, SOLUTION INTRAVENOUS
Status: DISCONTINUED | OUTPATIENT
Start: 2025-07-29 | End: 2025-07-29 | Stop reason: HOSPADM

## 2025-07-29 RX ORDER — MIDAZOLAM HYDROCHLORIDE 1 MG/ML
INJECTION, SOLUTION INTRAMUSCULAR; INTRAVENOUS
Status: DISCONTINUED | OUTPATIENT
Start: 2025-07-29 | End: 2025-07-29 | Stop reason: SDUPTHER

## 2025-07-29 RX ORDER — LIDOCAINE HYDROCHLORIDE 10 MG/ML
1 INJECTION, SOLUTION EPIDURAL; INFILTRATION; INTRACAUDAL; PERINEURAL
Status: DISCONTINUED | OUTPATIENT
Start: 2025-07-29 | End: 2025-07-29 | Stop reason: HOSPADM

## 2025-07-29 RX ORDER — NOREPINEPHRINE BITARTRATE/D5W 4MG/250ML
PLASTIC BAG, INJECTION (ML) INTRAVENOUS
Status: DISCONTINUED | OUTPATIENT
Start: 2025-07-29 | End: 2025-07-29 | Stop reason: SDUPTHER

## 2025-07-29 RX ORDER — EPHEDRINE SULFATE/0.9% NACL/PF 25 MG/5 ML
SYRINGE (ML) INTRAVENOUS
Status: DISCONTINUED | OUTPATIENT
Start: 2025-07-29 | End: 2025-07-29 | Stop reason: SDUPTHER

## 2025-07-29 RX ORDER — SUCCINYLCHOLINE/SOD CL,ISO/PF 100 MG/5ML
SYRINGE (ML) INTRAVENOUS
Status: DISCONTINUED | OUTPATIENT
Start: 2025-07-29 | End: 2025-07-29 | Stop reason: SDUPTHER

## 2025-07-29 RX ORDER — FENTANYL CITRATE 50 UG/ML
INJECTION, SOLUTION INTRAMUSCULAR; INTRAVENOUS
Status: DISCONTINUED | OUTPATIENT
Start: 2025-07-29 | End: 2025-07-29 | Stop reason: SDUPTHER

## 2025-07-29 RX ORDER — PHENYLEPHRINE HCL IN 0.9% NACL 0.4MG/10ML
SYRINGE (ML) INTRAVENOUS
Status: DISCONTINUED | OUTPATIENT
Start: 2025-07-29 | End: 2025-07-29 | Stop reason: SDUPTHER

## 2025-07-29 RX ORDER — ACETAMINOPHEN 500 MG
1000 TABLET ORAL ONCE
Status: COMPLETED | OUTPATIENT
Start: 2025-07-29 | End: 2025-07-29

## 2025-07-29 RX ORDER — MEPERIDINE HYDROCHLORIDE 25 MG/ML
12.5 INJECTION INTRAMUSCULAR; INTRAVENOUS; SUBCUTANEOUS EVERY 5 MIN PRN
Status: DISCONTINUED | OUTPATIENT
Start: 2025-07-29 | End: 2025-07-29 | Stop reason: HOSPADM

## 2025-07-29 RX ORDER — PROPOFOL 10 MG/ML
INJECTION, EMULSION INTRAVENOUS
Status: DISCONTINUED | OUTPATIENT
Start: 2025-07-29 | End: 2025-07-29 | Stop reason: SDUPTHER

## 2025-07-29 RX ORDER — HYDROMORPHONE HYDROCHLORIDE 1 MG/ML
1 INJECTION, SOLUTION INTRAMUSCULAR; INTRAVENOUS; SUBCUTANEOUS EVERY 5 MIN PRN
Status: DISCONTINUED | OUTPATIENT
Start: 2025-07-29 | End: 2025-07-29 | Stop reason: HOSPADM

## 2025-07-29 RX ORDER — DIPHENHYDRAMINE HYDROCHLORIDE 50 MG/ML
12.5 INJECTION, SOLUTION INTRAMUSCULAR; INTRAVENOUS
Status: DISCONTINUED | OUTPATIENT
Start: 2025-07-29 | End: 2025-07-29 | Stop reason: HOSPADM

## 2025-07-29 RX ORDER — FAMOTIDINE 10 MG/ML
INJECTION, SOLUTION INTRAVENOUS
Status: DISCONTINUED | OUTPATIENT
Start: 2025-07-29 | End: 2025-07-29 | Stop reason: SDUPTHER

## 2025-07-29 RX ORDER — IPRATROPIUM BROMIDE AND ALBUTEROL SULFATE 2.5; .5 MG/3ML; MG/3ML
1 SOLUTION RESPIRATORY (INHALATION)
Status: DISCONTINUED | OUTPATIENT
Start: 2025-07-29 | End: 2025-07-29 | Stop reason: HOSPADM

## 2025-07-29 RX ORDER — SCOPOLAMINE 1 MG/3D
1 PATCH, EXTENDED RELEASE TRANSDERMAL
Status: DISCONTINUED | OUTPATIENT
Start: 2025-07-29 | End: 2025-07-29 | Stop reason: HOSPADM

## 2025-07-29 RX ADMIN — FAMOTIDINE 20 MG: 10 INJECTION, SOLUTION INTRAVENOUS at 09:25

## 2025-07-29 RX ADMIN — EPHEDRINE SULFATE 5 MG: 5 INJECTION INTRAVENOUS at 08:22

## 2025-07-29 RX ADMIN — HYDROMORPHONE HYDROCHLORIDE 0.5 MG: 1 INJECTION, SOLUTION INTRAMUSCULAR; INTRAVENOUS; SUBCUTANEOUS at 11:38

## 2025-07-29 RX ADMIN — MIDAZOLAM HYDROCHLORIDE 2 MG: 1 INJECTION, SOLUTION INTRAMUSCULAR; INTRAVENOUS at 08:18

## 2025-07-29 RX ADMIN — Medication 3 MCG/MIN: at 08:44

## 2025-07-29 RX ADMIN — PROPOFOL 150 MG: 10 INJECTION, EMULSION INTRAVENOUS at 07:37

## 2025-07-29 RX ADMIN — SODIUM CHLORIDE, POTASSIUM CHLORIDE, SODIUM LACTATE AND CALCIUM CHLORIDE: 600; 310; 30; 20 INJECTION, SOLUTION INTRAVENOUS at 06:38

## 2025-07-29 RX ADMIN — FENTANYL CITRATE 50 MCG: 50 INJECTION, SOLUTION INTRAMUSCULAR; INTRAVENOUS at 07:37

## 2025-07-29 RX ADMIN — EPHEDRINE SULFATE 10 MG: 5 INJECTION INTRAVENOUS at 07:48

## 2025-07-29 RX ADMIN — Medication 80 MCG: at 07:52

## 2025-07-29 RX ADMIN — PROPOFOL 50 MG: 10 INJECTION, EMULSION INTRAVENOUS at 08:12

## 2025-07-29 RX ADMIN — CEFAZOLIN SODIUM 1800 MG: 1 POWDER, FOR SOLUTION INTRAMUSCULAR; INTRAVENOUS at 07:42

## 2025-07-29 RX ADMIN — LIDOCAINE HYDROCHLORIDE 40 MG: 20 INJECTION, SOLUTION EPIDURAL; INFILTRATION; INTRACAUDAL; PERINEURAL at 07:37

## 2025-07-29 RX ADMIN — PROPOFOL 150 MCG/KG/MIN: 10 INJECTION, EMULSION INTRAVENOUS at 07:38

## 2025-07-29 RX ADMIN — SODIUM CHLORIDE, POTASSIUM CHLORIDE, SODIUM LACTATE AND CALCIUM CHLORIDE: 600; 310; 30; 20 INJECTION, SOLUTION INTRAVENOUS at 08:51

## 2025-07-29 RX ADMIN — FENTANYL CITRATE 100 MCG: 50 INJECTION, SOLUTION INTRAMUSCULAR; INTRAVENOUS at 08:19

## 2025-07-29 RX ADMIN — CEFAZOLIN SODIUM 200 MG: 1 POWDER, FOR SOLUTION INTRAMUSCULAR; INTRAVENOUS at 07:10

## 2025-07-29 RX ADMIN — GABAPENTIN 300 MG: 300 CAPSULE ORAL at 06:34

## 2025-07-29 RX ADMIN — ACETAMINOPHEN 1000 MG: 500 TABLET ORAL at 06:35

## 2025-07-29 RX ADMIN — MIDAZOLAM HYDROCHLORIDE 3 MG: 1 INJECTION, SOLUTION INTRAMUSCULAR; INTRAVENOUS at 07:27

## 2025-07-29 RX ADMIN — Medication 80 MCG: at 07:44

## 2025-07-29 RX ADMIN — DEXMEDETOMIDINE 0.2 MCG/KG/HR: 100 INJECTION, SOLUTION INTRAVENOUS at 08:56

## 2025-07-29 RX ADMIN — EPHEDRINE SULFATE 10 MG: 5 INJECTION INTRAVENOUS at 07:41

## 2025-07-29 RX ADMIN — ONDANSETRON 4 MG: 2 INJECTION, SOLUTION INTRAMUSCULAR; INTRAVENOUS at 10:23

## 2025-07-29 RX ADMIN — ROCURONIUM BROMIDE 5 MG: 10 INJECTION, SOLUTION INTRAVENOUS at 07:37

## 2025-07-29 RX ADMIN — PROPOFOL 50 MG: 10 INJECTION, EMULSION INTRAVENOUS at 07:43

## 2025-07-29 RX ADMIN — Medication 80 MCG: at 08:22

## 2025-07-29 RX ADMIN — EPHEDRINE SULFATE 10 MG: 5 INJECTION INTRAVENOUS at 08:33

## 2025-07-29 RX ADMIN — Medication 100 MG: at 07:37

## 2025-07-29 RX ADMIN — DEXAMETHASONE SODIUM PHOSPHATE 4 MG: 4 INJECTION, SOLUTION INTRAMUSCULAR; INTRAVENOUS at 07:44

## 2025-07-29 ASSESSMENT — PAIN DESCRIPTION - DESCRIPTORS: DESCRIPTORS: SORE

## 2025-07-29 ASSESSMENT — PAIN SCALES - GENERAL
PAINLEVEL_OUTOF10: 4
PAINLEVEL_OUTOF10: 0

## 2025-07-29 ASSESSMENT — PAIN - FUNCTIONAL ASSESSMENT
PAIN_FUNCTIONAL_ASSESSMENT: PREVENTS OR INTERFERES SOME ACTIVE ACTIVITIES AND ADLS
PAIN_FUNCTIONAL_ASSESSMENT: 0-10

## 2025-07-29 ASSESSMENT — PAIN DESCRIPTION - LOCATION: LOCATION: ABDOMEN

## 2025-07-29 ASSESSMENT — PAIN DESCRIPTION - ORIENTATION: ORIENTATION: LEFT;RIGHT

## 2025-07-29 NOTE — ANESTHESIA POSTPROCEDURE EVALUATION
Department of Anesthesiology  Postprocedure Note    Patient: Florinda Rey  MRN: [id number]  YOB: 1955  Date of evaluation: 7/29/2025    Procedure Summary       Date: 07/29/25 Room / Location: [facility] MAIN OR  / [facility] MAIN OR    Anesthesia Start: 0727 Anesthesia Stop: 1105    Procedure: ABDOMINOPLASTY WITH LIPOSUCTION TO THE FLANKS (Abdomen) Diagnosis:       Excess skin of abdomen      (Excess skin of abdomen [L98.7])    Surgeons: Wandy Benítez MD Responsible Provider: Isamar Lopez MD    Anesthesia Type: general, TIVA ASA Status: 2            Anesthesia Type: No value filed.    Haroon Phase I: Haroon Score: 10    Haroon Phase II: Haroon Score: 10    Anesthesia Post Evaluation    Patient location during evaluation: PACU  Patient participation: complete - patient participated  Level of consciousness: awake  Airway patency: patent  Nausea & Vomiting: no vomiting and no nausea  Cardiovascular status: hemodynamically stable  Respiratory status: acceptable  Hydration status: stable  Pain management: adequate    No notable events documented.

## 2025-07-29 NOTE — H&P
History and Physical    Patient is a 69 y.o. well-developed, well nourished female who presents for abdominoplasty and liposuction to flanks.     Past Medical History:   Diagnosis Date    Arthritis     Depression     Gastroparesis     GERD (gastroesophageal reflux disease) 2012    High cholesterol     Hip fracture (HCC)     March 2021, right    HTN (hypertension)     Leaky heart valve 2010    negative workup by cardio    Menopause     LMP-29 years old    Motion sickness     Nausea & vomiting     violently ill with anesthesia     Past Surgical History:   Procedure Laterality Date    APPENDECTOMY      CARPAL TUNNEL RELEASE Right 2003    COLONOSCOPY N/A 6/22/2022    COLONOSCOPY performed by Kaushal Markham MD at [facility] ENDOSCOPY    DILATION AND CURETTAGE OF UTERUS      HYSTERECTOMY (CERVIX STATUS UNKNOWN)      LMP-29 years old    HYSTERECTOMY, TOTAL ABDOMINAL (CERVIX REMOVED)  1983    KNEE ARTHROSCOPY Bilateral 1987    lateral release    OTHER SURGICAL HISTORY      Surgery repairs on both knees/Lateral Release    SALPINGO-OOPHORECTOMY      SHOULDER ARTHROSCOPY      UPPER GASTROINTESTINAL ENDOSCOPY  2023     Prior to Admission medications    Medication Sig Start Date End Date Taking? Authorizing Provider   fluticasone (FLONASE) 50 MCG/ACT nasal spray 2 sprays by Each Nostril route in the morning and at bedtime  Patient taking differently: 2 sprays by Each Nostril route 2 times daily as needed 11/6/23  Yes Sanket Pereyra MD   atenolol (TENORMIN) 50 MG tablet Take 1 tablet by mouth daily 9/15/23  Yes Katty Malagon MD   atorvastatin (LIPITOR) 10 MG tablet Take 1 tablet by mouth every morning    Selvin Li MD   famotidine (PEPCID) 20 MG tablet Take 1 tablet by mouth 2 times daily    Selvin Li MD   Multiple Vitamins-Minerals (CENTRUM SILVER 50+WOMEN PO) Take 1 tablet by mouth every morning    Selvin Li MD   DULoxetine (CYMBALTA) 60 MG extended release capsule TAKE 1 CAPSULE BY MOUTH

## 2025-07-29 NOTE — OP NOTE
Operative Note      Patient: Florinda Rey  YOB: 1955  MRN: [id number]    Date of Procedure: 7/29/2025    Pre-Op Diagnosis Codes:      * Excess skin of abdomen [L98.7]    Post-Op Diagnosis: Same       Procedure(s):  ABDOMINOPLASTY WITH LIPOSUCTION TO THE FLANKS    Surgeon(s):  Wandy Benítez MD    Assistant:   Surgical Assistant: Roxana Shabazz    Anesthesia: General    Estimated Blood Loss (mL): Minimal    Complications: None    Specimens:   * No specimens in log *    Implants:  * No implants in log *      Drains:   Closed/Suction Drain Right Abdomen;RLQ Bulb (Active)   Dressing Status Clean, dry & intact 07/29/25 1103       Closed/Suction Drain Left LLQ;Abdomen Bulb (Active)   Dressing Status Clean, dry & intact 07/29/25 1103       [REMOVED] Urinary Catheter 07/29/25 2 Way;[place] (Removed)   $ Urethral catheter insertion Inserted for procedure 07/29/25 0841   Catheter Indications Perioperative use for selected surgical procedures 07/29/25 0841   Site Assessment No urethral drainage 07/29/25 0841   Urine Color Yellow 07/29/25 0841   Urine Appearance Clear 07/29/25 0841   Collection Container Standard 07/29/25 0841   Securement Method Securing device (Describe) 07/29/25 0841   Catheter Best Practices  Drainage tube clipped to bed;Catheter secured to thigh;Tamper seal intact;Bag below bladder;Bag not on floor;Lack of dependent loop in tubing;Drainage bag less than half full 07/29/25 0841   Status Draining 07/29/25 0841       Findings:  Present At Time Of Surgery (PATOS) (choose all levels that have infection present):  No infection present  Other Findings: consistent with procedure   Detailed Description of Procedure:     INDICATIONS FOR SURGERY:  This is a 69 y.o. year-old female , otherwise healthy, was unhappy with the appearance of her abdomen, specifically the redundant abdominal tissue and lipodystrophy to the flanks.  After evaluation and discussion, the patient decided to proceed with

## 2025-07-29 NOTE — ANESTHESIA PRE PROCEDURE
Department of Anesthesiology  Preprocedure Note       Name:  Florinda Rey   Age:  69 y.o.  :  1955                                          MRN:  [id number]         Date:  2025      Surgeon: Surgeon(s):  Wandy Bneítez MD    Procedure: Procedure(s):  ABDOMINOPLASTY WITH LIPOSUCTION TO THE FLANKS    Medications prior to admission:   Prior to Admission medications    Medication Sig Start Date End Date Taking? Authorizing Provider   fluticasone (FLONASE) 50 MCG/ACT nasal spray 2 sprays by Each Nostril route in the morning and at bedtime  Patient taking differently: 2 sprays by Each Nostril route 2 times daily as needed 23  Yes Sanket Pereyra MD   atenolol (TENORMIN) 50 MG tablet Take 1 tablet by mouth daily 9/15/23  Yes Katty Malagon MD   atorvastatin (LIPITOR) 10 MG tablet Take 1 tablet by mouth every morning    ProviderSelvin MD   famotidine (PEPCID) 20 MG tablet Take 1 tablet by mouth 2 times daily    ProviderSelvin MD   Multiple Vitamins-Minerals (CENTRUM SILVER 50+WOMEN PO) Take 1 tablet by mouth every morning    ProviderSelvin MD   DULoxetine (CYMBALTA) 60 MG extended release capsule TAKE 1 CAPSULE BY MOUTH DAILY 24   Katty Malagon MD       Current medications:    Current Facility-Administered Medications   Medication Dose Route Frequency Provider Last Rate Last Admin   • lidocaine PF 1 % injection 1 mL  1 mL IntraDERmal Once PRN Desmond Bradley DO       • acetaminophen (TYLENOL) tablet 650 mg  650 mg Oral Once Desmond Bradley DO       • lactated ringers infusion   IntraVENous Continuous Desmond Bradley  mL/hr at 25 0638 New Bag at 25 0638   • albuterol (PROVENTIL) (2.5 MG/3ML) 0.083% nebulizer solution 2.5 mg  2.5 mg Nebulization Once PRN Desmond Bradley DO       • ceFAZolin (ANCEF) 2,000 mg in sterile water 20 mL IV syringe  2,000 mg IntraVENous On Call to OR Wandy Benítez MD           Allergies:    Allergies   Allergen

## (undated) DEVICE — BAG BELONG PT PERS CLEAR HANDL

## (undated) DEVICE — BITEBLOCK ENDOSCP 60FR MAXI WHT POLYETH STURDY W/ VELC WVN

## (undated) DEVICE — BASIN EMSIS 16OZ GRAPHITE PLAS KID SHP MOLD GRAD FOR ORAL

## (undated) DEVICE — FORCEPS BX L240CM JAW DIA2.8MM L CAP W/ NDL MIC MESH TOOTH

## (undated) DEVICE — FCPS RAD JAW 4LC 240CM W/NDL -- BX/40

## (undated) DEVICE — DRESSING FOAM DISK DIA1IN H 7MM HYDRPHLC CHG IMPREG IN SL

## (undated) DEVICE — SET ADMIN 16ML TBNG L100IN 2 Y INJ SITE IV PIGGY BK DISP (ORDER IN MULIPLES OF 48)

## (undated) DEVICE — CANNULA CUSH AD W/ 14FT TBG

## (undated) DEVICE — BAG SPEC BIOHZRD 10 X 10 IN --

## (undated) DEVICE — ESOPHAGEAL BALLOON DILATATION CATHETER: Brand: CRE FIXED WIRE

## (undated) DEVICE — GLOVE SURG SZ 6 THK91MIL LTX FREE SYN POLYISOPRENE ANTI

## (undated) DEVICE — PLASTICS -SFMC: Brand: MEDLINE INDUSTRIES, INC.

## (undated) DEVICE — APPLICATOR MEDICATED 26 CC SOLUTION HI LT ORNG CHLORAPREP

## (undated) DEVICE — ELECTRODE,RADIOTRANSLUCENT,FOAM,3PK: Brand: MEDLINE

## (undated) DEVICE — CUFF RMFG BP INF SZ 11 DISP -- LAWSON OEM ITEM 238915

## (undated) DEVICE — SIMPLICITY FLUFF UNDERPAD 23X36, MODERATE: Brand: SIMPLICITY

## (undated) DEVICE — 1200 GUARD II KIT W/5MM TUBE W/O VAC TUBE: Brand: GUARDIAN

## (undated) DEVICE — PAD,ABDOMINAL,5"X9",ST,LF,25/BX: Brand: MEDLINE

## (undated) DEVICE — SOLIDIFIER MEDC 1200ML -- CONVERT TO 356117

## (undated) DEVICE — SUTURE MONOCRYL SZ 4-0 L27IN ABSRB UD L19MM PS-2 1/2 CIR PRIM Y426H

## (undated) DEVICE — MARKER,SKIN,WI/RULER AND LABELS: Brand: MEDLINE

## (undated) DEVICE — SUTURE PROL SZ 0 L30IN NONABSORBABLE BLU L36MM CT-1 1/2 CIR 8424H

## (undated) DEVICE — GLOVE SURG SZ 65 L12IN FNGR THK79MIL GRN LTX FREE

## (undated) DEVICE — CANISTER, RIGID, 3000CC: Brand: MEDLINE INDUSTRIES, INC.

## (undated) DEVICE — SUTURE ABSRB L12IN L12MM SZ 2-0 GS-22 VLT GLYCOLIDE VLOCM2115

## (undated) DEVICE — BLADE,CARBON-STEEL,11,STRL,DISPOSABLE,TB: Brand: MEDLINE

## (undated) DEVICE — 3M™ TEGADERM™ TRANSPARENT FILM DRESSING FRAME STYLE, 1626W, 4 IN X 4-3/4 IN (10 CM X 12 CM), 50/CT 4CT/CASE: Brand: 3M™ TEGADERM™

## (undated) DEVICE — DRAIN SURG 15FR SIL RND CHN W/ TRCR FULL FLUT DBL WRP TRAD

## (undated) DEVICE — Device

## (undated) DEVICE — SUTURE PERMA-HAND SZ 2-0 L30IN NONABSORBABLE BLK L26MM SH K833H

## (undated) DEVICE — BANDAGE,GAUZE,BULKEE II,4.5"X4.1YD,STRL: Brand: MEDLINE

## (undated) DEVICE — BLADE ES L6IN ELASTOMERIC COAT INSUL DURABLE BEND UPTO

## (undated) DEVICE — SOLUTION IRRIG 1000ML H2O PIC PLAS SHATTERPROOF CONTAINER

## (undated) DEVICE — (D)SENSOR RMFG 02 PULS OXMTR -- DISC BY MFR USE ITEM 133445

## (undated) DEVICE — LIQUIBAND SECURE WOUND CLOSURE, 66CM: Brand: MEDLINE

## (undated) DEVICE — BLOCK BTE ENDOSCP AD 60FR 20MM -- MAXI BITE LF STRAP

## (undated) DEVICE — CELLERATERX® SURGICAL ACTIVATED COLLAGEN® POWDER IS TYPE I BOVINE HYDROLYZED COLLAGEN AND CONTAINS NO ADDITIVES.: Brand: CELLERATERX SURGICAL

## (undated) DEVICE — TUBING, SUCTION, 1/4" X 10', STRAIGHT: Brand: MEDLINE

## (undated) DEVICE — 3MM SINGLE USE CANNULA, 8MM PORT, 30CM LONG, MERCEDES: Brand: MICROAIRE®

## (undated) DEVICE — 1LYRTR 16FR10ML100%SIL UMS SNP: Brand: MEDLINE INDUSTRIES, INC.

## (undated) DEVICE — KIT COLON DUAL END BRSH W/LUBE -- CUSTOM BX/20 FORMERLY KS-18-20

## (undated) DEVICE — 3MM SINGLE USE CANNULA, 8MM PORT, 30CM LONG, FLARED MERCEDES: Brand: MICROAIRE®

## (undated) DEVICE — SOLUTION IRRIG 1000ML 09% SOD CHL USP PIC PLAS CONTAINER

## (undated) DEVICE — DRESSING,GAUZE,XEROFORM,CURAD,1"X8",ST: Brand: CURAD

## (undated) DEVICE — PACK,ORTOHMAX/CVMAX,UNIVERSAL,5/CS: Brand: MEDLINE

## (undated) DEVICE — CATH IV AUTOGRD BC PNK 20GA 25 -- INSYTE

## (undated) DEVICE — NEEDLE HYPO 25GA L1.5IN BLU POLYPR HUB S STL REG BVL STR

## (undated) DEVICE — 3M™ CUROS™ DISINFECTING CAP FOR NEEDLELESS CONNECTORS 270/CARTON 20 CARTONS/CASE CFF1-270: Brand: CUROS™

## (undated) DEVICE — CONTAINER SPEC 20 ML LID NEUT BUFF FORMALIN 10 % POLYPR STS

## (undated) DEVICE — SUTURE MONOCRYL SZ 3-0 L27IN ABSRB UD L26MM SH 1/2 CIR Y416H

## (undated) DEVICE — DRAPE FLD WRM W44XL66IN C6L FOR INTRATEMP SYS THERMABASIN

## (undated) DEVICE — RESERVOIR,SUCTION,100CC,SILICONE: Brand: MEDLINE

## (undated) DEVICE — CANN NASAL O2 CAPNOGRAPHY AD -- FILTERLINE

## (undated) DEVICE — SYRINGE MED 10ML LUERLOCK TIP W/O SFTY DISP

## (undated) DEVICE — SUTURE VICRYL SZ 2-0 L27IN ABSRB UD L26MM SH 1/2 CIR J417H

## (undated) DEVICE — SET IV ADMIN GRAVITY W/STPCOCK --

## (undated) DEVICE — SET ADMIN 16ML TBNG L100IN 2 Y INJ SITE IV PIGGY BK DISP

## (undated) DEVICE — SUTURE ETHILON SZ 2-0 L18IN NONABSORBABLE BLK L19MM PS-2 PRIM 593H

## (undated) DEVICE — KIT COLON W/ 1.1OZ LUB AND 2 END